# Patient Record
Sex: MALE | Race: WHITE | NOT HISPANIC OR LATINO | URBAN - METROPOLITAN AREA
[De-identification: names, ages, dates, MRNs, and addresses within clinical notes are randomized per-mention and may not be internally consistent; named-entity substitution may affect disease eponyms.]

---

## 2017-09-19 ENCOUNTER — APPOINTMENT (OUTPATIENT)
Dept: URBAN - METROPOLITAN AREA CLINIC 200 | Age: 62
Setting detail: DERMATOLOGY
End: 2017-09-24

## 2017-09-19 DIAGNOSIS — L57.8 OTHER SKIN CHANGES DUE TO CHRONIC EXPOSURE TO NONIONIZING RADIATION: ICD-10-CM

## 2017-09-19 DIAGNOSIS — L40.8 OTHER PSORIASIS: ICD-10-CM

## 2017-09-19 PROCEDURE — 99213 OFFICE O/P EST LOW 20 MIN: CPT

## 2017-09-19 PROCEDURE — OTHER PRESCRIPTION SAMPLES PROVIDED: OTHER

## 2017-09-19 PROCEDURE — OTHER COUNSELING: OTHER

## 2017-09-19 ASSESSMENT — LOCATION ZONE DERM
LOCATION ZONE: TRUNK
LOCATION ZONE: TOENAIL

## 2017-09-19 ASSESSMENT — LOCATION DETAILED DESCRIPTION DERM
LOCATION DETAILED: RIGHT GREAT TOENAIL
LOCATION DETAILED: RIGHT INFERIOR MEDIAL UPPER BACK

## 2017-09-19 ASSESSMENT — LOCATION SIMPLE DESCRIPTION DERM
LOCATION SIMPLE: RIGHT UPPER BACK
LOCATION SIMPLE: RIGHT GREAT TOE

## 2017-09-19 ASSESSMENT — PGA PSORIASIS: PGA PSORIASIS: MODERATE (MODERATE PLAQUE ELEVATION, MODERATE ERYTHEMA, COARSE SCALE PREDOMINATES)

## 2018-09-18 ENCOUNTER — APPOINTMENT (OUTPATIENT)
Dept: URBAN - METROPOLITAN AREA CLINIC 200 | Age: 63
Setting detail: DERMATOLOGY
End: 2018-09-23

## 2018-09-18 DIAGNOSIS — L57.8 OTHER SKIN CHANGES DUE TO CHRONIC EXPOSURE TO NONIONIZING RADIATION: ICD-10-CM

## 2018-09-18 PROBLEM — D23.5 OTHER BENIGN NEOPLASM OF SKIN OF TRUNK: Status: ACTIVE | Noted: 2018-09-18

## 2018-09-18 PROCEDURE — OTHER COUNSELING: OTHER

## 2018-09-18 PROCEDURE — 99213 OFFICE O/P EST LOW 20 MIN: CPT

## 2018-09-18 ASSESSMENT — LOCATION DETAILED DESCRIPTION DERM: LOCATION DETAILED: LEFT MEDIAL SUPERIOR CHEST

## 2018-09-18 ASSESSMENT — LOCATION ZONE DERM: LOCATION ZONE: TRUNK

## 2018-09-18 ASSESSMENT — LOCATION SIMPLE DESCRIPTION DERM: LOCATION SIMPLE: CHEST

## 2019-09-26 ENCOUNTER — APPOINTMENT (OUTPATIENT)
Dept: URBAN - METROPOLITAN AREA CLINIC 200 | Age: 64
Setting detail: DERMATOLOGY
End: 2019-09-30

## 2019-09-26 DIAGNOSIS — L82.1 OTHER SEBORRHEIC KERATOSIS: ICD-10-CM

## 2019-09-26 DIAGNOSIS — L57.8 OTHER SKIN CHANGES DUE TO CHRONIC EXPOSURE TO NONIONIZING RADIATION: ICD-10-CM

## 2019-09-26 PROBLEM — D23.5 OTHER BENIGN NEOPLASM OF SKIN OF TRUNK: Status: ACTIVE | Noted: 2019-09-26

## 2019-09-26 PROCEDURE — OTHER MIPS QUALITY: OTHER

## 2019-09-26 PROCEDURE — 99213 OFFICE O/P EST LOW 20 MIN: CPT

## 2019-09-26 PROCEDURE — OTHER REASSURANCE: OTHER

## 2019-09-26 PROCEDURE — OTHER COUNSELING: OTHER

## 2019-09-26 ASSESSMENT — LOCATION SIMPLE DESCRIPTION DERM
LOCATION SIMPLE: CHEST
LOCATION SIMPLE: RIGHT THIGH

## 2019-09-26 ASSESSMENT — LOCATION ZONE DERM
LOCATION ZONE: LEG
LOCATION ZONE: TRUNK

## 2019-09-26 ASSESSMENT — LOCATION DETAILED DESCRIPTION DERM
LOCATION DETAILED: LEFT MEDIAL SUPERIOR CHEST
LOCATION DETAILED: RIGHT ANTERIOR PROXIMAL THIGH

## 2020-04-23 ENCOUNTER — APPOINTMENT (OUTPATIENT)
Dept: URBAN - METROPOLITAN AREA CLINIC 200 | Age: 65
Setting detail: DERMATOLOGY
End: 2020-04-23

## 2020-04-23 DIAGNOSIS — L20.84 INTRINSIC (ALLERGIC) ECZEMA: ICD-10-CM

## 2020-04-23 PROCEDURE — OTHER PRESCRIPTION MEDICATION MANAGEMENT: OTHER

## 2020-04-23 PROCEDURE — OTHER REASON FOR TELEMEDICINE VISIT: OTHER

## 2020-04-23 PROCEDURE — OTHER TELEHEALTH ASSESSMENT: OTHER

## 2020-04-23 PROCEDURE — OTHER REASSURANCE: OTHER

## 2020-04-23 PROCEDURE — 99212 OFFICE O/P EST SF 10 MIN: CPT | Mod: 95

## 2020-04-23 PROCEDURE — OTHER COUNSELING: OTHER

## 2020-04-23 PROCEDURE — OTHER CONSENT FOR TELEMEDICINE VISIT OBTAINED: OTHER

## 2020-04-23 ASSESSMENT — LOCATION SIMPLE DESCRIPTION DERM: LOCATION SIMPLE: RIGHT PRETIBIAL REGION

## 2020-04-23 ASSESSMENT — LOCATION ZONE DERM: LOCATION ZONE: LEG

## 2020-04-23 ASSESSMENT — LOCATION DETAILED DESCRIPTION DERM: LOCATION DETAILED: RIGHT DISTAL PRETIBIAL REGION

## 2020-04-23 ASSESSMENT — BSA RASH: BSA RASH: 1

## 2020-04-23 ASSESSMENT — SEVERITY ASSESSMENT 2020: SEVERITY 2020: MILD

## 2020-04-23 NOTE — PROCEDURE: PRESCRIPTION MEDICATION MANAGEMENT
Detail Level: Detailed
Otc Regimen: Thick moisturizer.  No Dial soap.  Continue to use Dove.\\n
Plan: Will call in steroid if needed
Render In Strict Bullet Format?: No

## 2020-09-29 ENCOUNTER — APPOINTMENT (OUTPATIENT)
Dept: URBAN - METROPOLITAN AREA CLINIC 200 | Age: 65
Setting detail: DERMATOLOGY
End: 2020-10-04

## 2020-09-29 DIAGNOSIS — L57.8 OTHER SKIN CHANGES DUE TO CHRONIC EXPOSURE TO NONIONIZING RADIATION: ICD-10-CM

## 2020-09-29 PROBLEM — I48.91 UNSPECIFIED ATRIAL FIBRILLATION: Status: ACTIVE | Noted: 2020-09-29

## 2020-09-29 PROCEDURE — 99213 OFFICE O/P EST LOW 20 MIN: CPT

## 2020-09-29 PROCEDURE — OTHER COUNSELING: OTHER

## 2020-09-29 ASSESSMENT — LOCATION DETAILED DESCRIPTION DERM: LOCATION DETAILED: LEFT MEDIAL SUPERIOR CHEST

## 2020-09-29 ASSESSMENT — LOCATION SIMPLE DESCRIPTION DERM: LOCATION SIMPLE: CHEST

## 2020-09-29 ASSESSMENT — LOCATION ZONE DERM: LOCATION ZONE: TRUNK

## 2022-10-12 ENCOUNTER — APPOINTMENT (OUTPATIENT)
Dept: URBAN - METROPOLITAN AREA CLINIC 203 | Age: 67
Setting detail: DERMATOLOGY
End: 2022-10-12

## 2022-10-12 DIAGNOSIS — L82.1 OTHER SEBORRHEIC KERATOSIS: ICD-10-CM

## 2022-10-12 DIAGNOSIS — L57.0 ACTINIC KERATOSIS: ICD-10-CM

## 2022-10-12 DIAGNOSIS — Z11.52 ENCOUNTER FOR SCREENING FOR COVID-19: ICD-10-CM

## 2022-10-12 DIAGNOSIS — L57.8 OTHER SKIN CHANGES DUE TO CHRONIC EXPOSURE TO NONIONIZING RADIATION: ICD-10-CM

## 2022-10-12 PROCEDURE — OTHER MIPS QUALITY: OTHER

## 2022-10-12 PROCEDURE — OTHER SCREENING FOR COVID-19: OTHER

## 2022-10-12 PROCEDURE — OTHER SUNSCREEN RECOMMENDATIONS: OTHER

## 2022-10-12 PROCEDURE — OTHER LIQUID NITROGEN: OTHER

## 2022-10-12 PROCEDURE — 17003 DESTRUCT PREMALG LES 2-14: CPT

## 2022-10-12 PROCEDURE — 99213 OFFICE O/P EST LOW 20 MIN: CPT | Mod: 25

## 2022-10-12 PROCEDURE — OTHER COUNSELING: OTHER

## 2022-10-12 PROCEDURE — OTHER REASSURANCE: OTHER

## 2022-10-12 PROCEDURE — 17000 DESTRUCT PREMALG LESION: CPT

## 2022-10-12 ASSESSMENT — LOCATION DETAILED DESCRIPTION DERM
LOCATION DETAILED: RIGHT CENTRAL PARIETAL SCALP
LOCATION DETAILED: RIGHT SUPERIOR PARIETAL SCALP
LOCATION DETAILED: LEFT SUPERIOR PARIETAL SCALP
LOCATION DETAILED: RIGHT VENTRAL PROXIMAL FOREARM
LOCATION DETAILED: LEFT MEDIAL INFERIOR CHEST
LOCATION DETAILED: EPIGASTRIC SKIN
LOCATION DETAILED: RIGHT VENTRAL PROXIMAL FOREARM
LOCATION DETAILED: RIGHT CENTRAL FRONTAL SCALP
LOCATION DETAILED: MID-OCCIPITAL SCALP

## 2022-10-12 ASSESSMENT — LOCATION ZONE DERM
LOCATION ZONE: TRUNK
LOCATION ZONE: ARM
LOCATION ZONE: SCALP
LOCATION ZONE: ARM

## 2022-10-12 ASSESSMENT — LOCATION SIMPLE DESCRIPTION DERM
LOCATION SIMPLE: ABDOMEN
LOCATION SIMPLE: POSTERIOR SCALP
LOCATION SIMPLE: CHEST
LOCATION SIMPLE: RIGHT FOREARM
LOCATION SIMPLE: RIGHT FOREARM
LOCATION SIMPLE: SCALP
LOCATION SIMPLE: RIGHT SCALP

## 2022-10-12 ASSESSMENT — PAIN INTENSITY VAS: HOW INTENSE IS YOUR PAIN 0 BEING NO PAIN, 10 BEING THE MOST SEVERE PAIN POSSIBLE?: NO PAIN

## 2022-10-12 NOTE — PROCEDURE: LIQUID NITROGEN
Show Aperture Variable?: Yes
Consent: The patient's consent was obtained including but not limited to risks of crusting, scabbing, blistering, scarring, darker or lighter pigmentary change, recurrence, incomplete removal and infection.
Render Post-Care Instructions In Note?: no
Post-Care Instructions: I reviewed with the patient in detail post-care instructions. Patient is to wear sunprotection, and avoid picking at any of the treated lesions. Pt may apply Vaseline to crusted or scabbing areas.
Duration Of Freeze Thaw-Cycle (Seconds): 0
Application Tool (Optional): Liquid Nitrogen Sprayer
Detail Level: Detailed

## 2023-12-20 ENCOUNTER — APPOINTMENT (OUTPATIENT)
Dept: URBAN - METROPOLITAN AREA CLINIC 203 | Age: 68
Setting detail: DERMATOLOGY
End: 2023-12-21

## 2023-12-20 DIAGNOSIS — L57.8 OTHER SKIN CHANGES DUE TO CHRONIC EXPOSURE TO NONIONIZING RADIATION: ICD-10-CM

## 2023-12-20 DIAGNOSIS — L57.0 ACTINIC KERATOSIS: ICD-10-CM

## 2023-12-20 PROCEDURE — OTHER LIQUID NITROGEN: OTHER

## 2023-12-20 PROCEDURE — OTHER SUNSCREEN RECOMMENDATIONS: OTHER

## 2023-12-20 PROCEDURE — 99213 OFFICE O/P EST LOW 20 MIN: CPT | Mod: 25

## 2023-12-20 PROCEDURE — 17003 DESTRUCT PREMALG LES 2-14: CPT

## 2023-12-20 PROCEDURE — OTHER COUNSELING: OTHER

## 2023-12-20 PROCEDURE — 17000 DESTRUCT PREMALG LESION: CPT

## 2023-12-20 ASSESSMENT — LOCATION DETAILED DESCRIPTION DERM
LOCATION DETAILED: RIGHT MID-UPPER BACK
LOCATION DETAILED: LEFT MEDIAL FRONTAL SCALP
LOCATION DETAILED: LEFT SUPERIOR PARIETAL SCALP
LOCATION DETAILED: RIGHT SUPERIOR FOREHEAD
LOCATION DETAILED: LEFT MEDIAL INFERIOR CHEST
LOCATION DETAILED: LEFT LATERAL FRONTAL SCALP
LOCATION DETAILED: RIGHT CENTRAL FRONTAL SCALP
LOCATION DETAILED: RIGHT SUPERIOR PARIETAL SCALP
LOCATION DETAILED: RIGHT MEDIAL FRONTAL SCALP

## 2023-12-20 ASSESSMENT — LOCATION ZONE DERM
LOCATION ZONE: TRUNK
LOCATION ZONE: SCALP
LOCATION ZONE: FACE

## 2023-12-20 ASSESSMENT — LOCATION SIMPLE DESCRIPTION DERM
LOCATION SIMPLE: RIGHT FOREHEAD
LOCATION SIMPLE: RIGHT UPPER BACK
LOCATION SIMPLE: LEFT SCALP
LOCATION SIMPLE: CHEST
LOCATION SIMPLE: RIGHT SCALP
LOCATION SIMPLE: SCALP

## 2023-12-20 ASSESSMENT — PAIN INTENSITY VAS: HOW INTENSE IS YOUR PAIN 0 BEING NO PAIN, 10 BEING THE MOST SEVERE PAIN POSSIBLE?: NO PAIN

## 2023-12-20 NOTE — PROCEDURE: LIQUID NITROGEN
Post-Care Instructions: I reviewed with the patient in detail post-care instructions. Patient is to wear sunprotection, and avoid picking at any of the treated lesions. Pt may apply Vaseline to crusted or scabbing areas.
Render Post-Care Instructions In Note?: no
Consent: The patient's consent was obtained including but not limited to risks of crusting, scabbing, blistering, scarring, darker or lighter pigmentary change, recurrence, incomplete removal and infection.
Duration Of Freeze Thaw-Cycle (Seconds): 5
Detail Level: Zone
Show Applicator Variable?: Yes
Number Of Freeze-Thaw Cycles: 2 freeze-thaw cycles
Application Tool (Optional): Liquid Nitrogen Sprayer

## 2024-12-23 ENCOUNTER — PRE-ADMISSION TESTING (OUTPATIENT)
Dept: PREADMISSION TESTING | Age: 69
End: 2024-12-23
Payer: COMMERCIAL

## 2024-12-23 VITALS — HEIGHT: 71 IN | WEIGHT: 230 LBS | BODY MASS INDEX: 32.2 KG/M2

## 2024-12-23 RX ORDER — NAPROXEN SODIUM 220 MG/1
81 TABLET, FILM COATED ORAL DAILY
COMMUNITY

## 2024-12-23 RX ORDER — TAMSULOSIN HYDROCHLORIDE 0.4 MG/1
0.4 CAPSULE ORAL NIGHTLY
COMMUNITY
Start: 2024-12-10

## 2024-12-23 RX ORDER — LOVASTATIN 40 MG/1
TABLET ORAL
COMMUNITY
Start: 2024-11-29

## 2024-12-23 RX ORDER — LEVOTHYROXINE SODIUM 125 UG/1
125 TABLET ORAL
COMMUNITY
Start: 2024-11-27

## 2024-12-23 RX ORDER — METOPROLOL SUCCINATE 50 MG/1
TABLET, EXTENDED RELEASE ORAL
Status: ON HOLD | COMMUNITY
Start: 2024-11-29 | End: 2025-01-16

## 2024-12-23 RX ORDER — PANTOPRAZOLE SODIUM 40 MG/1
TABLET, DELAYED RELEASE ORAL
COMMUNITY
Start: 2024-12-04

## 2024-12-23 RX ORDER — ZOLPIDEM TARTRATE 6.25 MG/1
6.25 TABLET, FILM COATED, EXTENDED RELEASE ORAL NIGHTLY PRN
Status: ON HOLD | COMMUNITY
End: 2025-01-16

## 2024-12-23 RX ORDER — HYDROCHLOROTHIAZIDE 12.5 MG/1
12.5 TABLET ORAL DAILY
Status: ON HOLD | COMMUNITY
Start: 2024-12-10 | End: 2025-01-16

## 2024-12-23 RX ORDER — IBUPROFEN 800 MG/1
800 TABLET ORAL 3 TIMES DAILY PRN
COMMUNITY
Start: 2024-11-12 | End: 2025-01-16 | Stop reason: HOSPADM

## 2024-12-23 RX ORDER — LOSARTAN POTASSIUM 50 MG/1
50 TABLET ORAL 2 TIMES DAILY
Status: ON HOLD | COMMUNITY
Start: 2024-12-13 | End: 2025-01-16

## 2024-12-23 NOTE — PRE-PROCEDURE INSTRUCTIONS
[facility]  [address] [place] [address]    1.       Admissions will call you with your arrival time on 1/14 (day prior to surgery) between 2pm-4pm.  For questions about your arrival time, please call [phone].    2.       On the day of your procedure please report to the [facility] in the [place]. Please arrive through the [facility].  If you are parking in the [facility], come to the ground floor of the garage and follow signs to the [facility].  After being screened, please report to either the Outpatient Registration for Pre-Admission Testing or to the Surgery Registration Desk.    3. Please follow the following fasting guidelines:     No solid food EIGHT HOURS prior to arrival time on day of surgery.  6 ounces of clear liquids, meaning water or PLAIN black coffee WITHOUT any milk, cream, sugar, or sweetener are permitted up to TWO HOURS prior to arrival at the hospital.    4. Please take ONLY the following medications with a sip of water on the morning of your procedure: (populate names and/or NONE)  Levothyroxine, Pantoprazole    5. Other Instructions: You may brush your teeth the morning of the procedure. Rinse and spit, do not swallow.  Bring a list of your medications with dosages.  Use surgical wash as directed.     6. If you develop a cold, cough, fever, rash, or other symptom prior to the day of the procedure, please report it to your physician immediately.    7. If you need to cancel the procedure for any reason, please contact your physician.    8. Make arrangements to have safe transportation home accompanied by a responsible adult. If you have not arranged safe transportation home, your surgery will be cancelled. Safe transportation may include private vehicle, ride-share service, taxi and public transportation when accompanied by a responsible adult who will assist you home. A responsible adult is someone known to you and does not  include the taxi, ride-share or public transit drive transporting you.    9.  If it is medically necessary for you to have a longer stay, you will be informed as soon as the decision is made.    10. Only bring essential items to the hospital.  Do not wear or bring anything of value to the hospital including jewelry of any kind, money, or wallet. Do not wear make-up or contact lenses.  DO NOT BRING MEDICATIONS FROM HOME unless instructed to do so. DO bring your hearing aids, glasses, and a case    11. No lotion, creams, powders, or oils on skin the morning of procedure     12. Dress in comfortable clothes.    13.  If instructed, please bring a copy of your Advanced Directive (Living Will/Durable Power of ) on the day of your procedure.     14. Ensuring your safety at all times is a very important part of our [facility] Culture of Safety. After having surgery and sedation, you are at risk for falling and balance issues. Although you may feel awake, the effects of the medication can last up to 24 hours after anesthesia. If you need to use the bathroom during your recovery period, nursing staff will escort you there and stay with you to ensure your safety.    15. Refrain from drinking alcohol and smoking cigarettes for 24 hours prior to surgery.    16. Shower with antibacterial soap (Dial) the night before and morning of your procedure.  If your procedure indicates the need for CHG antiseptic wash (Bactoshield or Hibiclens), please use this instead and follow instructions as discussed at the time of your Pre-Admission Testing phone interview or visit.    Above instructions reviewed with patient and patient acknowledges understanding.    Form explained by: Funmilayo Jameson RN

## 2024-12-26 ENCOUNTER — TRANSCRIBE ORDERS (OUTPATIENT)
Dept: PREADMISSION TESTING | Facility: HOSPITAL | Age: 69
End: 2024-12-26

## 2024-12-26 DIAGNOSIS — M19.012 PRIMARY OSTEOARTHRITIS, LEFT SHOULDER: ICD-10-CM

## 2024-12-26 DIAGNOSIS — M75.122 COMPLETE ROTATOR CUFF TEAR OR RUPTURE OF LEFT SHOULDER, NOT SPECIFIED AS TRAUMATIC: Primary | ICD-10-CM

## 2024-12-27 ENCOUNTER — HOSPITAL ENCOUNTER (OUTPATIENT)
Dept: CARDIOLOGY | Facility: HOSPITAL | Age: 69
Discharge: HOME | End: 2024-12-27
Attending: ORTHOPAEDIC SURGERY
Payer: COMMERCIAL

## 2024-12-27 ENCOUNTER — PRE-ADMISSION TESTING (OUTPATIENT)
Dept: PREADMISSION TESTING | Facility: HOSPITAL | Age: 69
End: 2024-12-27
Attending: ORTHOPAEDIC SURGERY
Payer: COMMERCIAL

## 2024-12-27 VITALS
TEMPERATURE: 97.8 F | WEIGHT: 236.9 LBS | HEIGHT: 71 IN | SYSTOLIC BLOOD PRESSURE: 134 MMHG | OXYGEN SATURATION: 97 % | BODY MASS INDEX: 33.17 KG/M2 | HEART RATE: 83 BPM | RESPIRATION RATE: 18 BRPM | DIASTOLIC BLOOD PRESSURE: 79 MMHG

## 2024-12-27 DIAGNOSIS — N40.0 BENIGN PROSTATIC HYPERPLASIA, UNSPECIFIED WHETHER LOWER URINARY TRACT SYMPTOMS PRESENT: ICD-10-CM

## 2024-12-27 DIAGNOSIS — I48.0 PAF (PAROXYSMAL ATRIAL FIBRILLATION) (CMS/HCC): ICD-10-CM

## 2024-12-27 DIAGNOSIS — E03.9 HYPOTHYROIDISM, UNSPECIFIED TYPE: ICD-10-CM

## 2024-12-27 DIAGNOSIS — Z01.818 ENCOUNTER FOR PRE-OPERATIVE EXAMINATION: Primary | ICD-10-CM

## 2024-12-27 DIAGNOSIS — E78.49 OTHER HYPERLIPIDEMIA: ICD-10-CM

## 2024-12-27 DIAGNOSIS — M75.122 COMPLETE ROTATOR CUFF TEAR OR RUPTURE OF LEFT SHOULDER, NOT SPECIFIED AS TRAUMATIC: ICD-10-CM

## 2024-12-27 DIAGNOSIS — M19.012 PRIMARY OSTEOARTHRITIS, LEFT SHOULDER: ICD-10-CM

## 2024-12-27 DIAGNOSIS — I10 PRIMARY HYPERTENSION: ICD-10-CM

## 2024-12-27 DIAGNOSIS — E87.1 HYPONATREMIA: ICD-10-CM

## 2024-12-27 DIAGNOSIS — M19.012 PRIMARY OSTEOARTHRITIS OF LEFT SHOULDER: ICD-10-CM

## 2024-12-27 DIAGNOSIS — I71.21 ANEURYSM OF ASCENDING AORTA WITHOUT RUPTURE (CMS/HCC): ICD-10-CM

## 2024-12-27 DIAGNOSIS — I25.10 CORONARY ARTERY DISEASE INVOLVING NATIVE CORONARY ARTERY OF NATIVE HEART WITHOUT ANGINA PECTORIS: ICD-10-CM

## 2024-12-27 LAB
ABO + RH BLD: NORMAL
ANION GAP SERPL CALC-SCNC: 9 MEQ/L (ref 3–15)
ATRIAL RATE: 83
BLD GP AB SCN SERPL QL: NEGATIVE
BLOOD BANK CMNT PATIENT-IMP: NORMAL
BUN SERPL-MCNC: 16 MG/DL (ref 7–25)
CALCIUM SERPL-MCNC: 9.9 MG/DL (ref 8.6–10.3)
CHLORIDE SERPL-SCNC: 98 MEQ/L (ref 98–107)
CO2 SERPL-SCNC: 24 MEQ/L (ref 21–31)
CREAT SERPL-MCNC: 0.9 MG/DL (ref 0.7–1.3)
D AG BLD QL: POSITIVE
EGFRCR SERPLBLD CKD-EPI 2021: >60 ML/MIN/1.73M*2
ERYTHROCYTE [DISTWIDTH] IN BLOOD BY AUTOMATED COUNT: 11.9 % (ref 11.6–14.4)
GLUCOSE SERPL-MCNC: 106 MG/DL (ref 70–99)
HCT VFR BLD AUTO: 40.1 % (ref 40.1–51)
HGB BLD-MCNC: 14.2 G/DL (ref 13.7–17.5)
LABORATORY COMMENT REPORT: NORMAL
MCH RBC QN AUTO: 34.3 PG (ref 28–33.2)
MCHC RBC AUTO-ENTMCNC: 35.4 G/DL (ref 32.2–36.5)
MCV RBC AUTO: 96.9 FL (ref 83–98)
P AXIS: 41
PLATELET # BLD AUTO: 177 K/UL (ref 150–350)
PMV BLD AUTO: 10.9 FL (ref 9.4–12.4)
POTASSIUM SERPL-SCNC: 4 MEQ/L (ref 3.5–5.1)
PR INTERVAL: 186
QRS DURATION: 92
QT INTERVAL: 372
QTC CALCULATION(BAZETT): 437
R AXIS: 62
RBC # BLD AUTO: 4.14 M/UL (ref 4.5–5.8)
SODIUM SERPL-SCNC: 131 MEQ/L (ref 136–145)
SPECIMEN EXP DATE BLD: NORMAL
T WAVE AXIS: 53
VENTRICULAR RATE: 83
WBC # BLD AUTO: 4.7 K/UL (ref 3.8–10.5)

## 2024-12-27 PROCEDURE — 99204 OFFICE O/P NEW MOD 45 MIN: CPT | Performed by: HOSPITALIST

## 2024-12-27 PROCEDURE — 93005 ELECTROCARDIOGRAM TRACING: CPT

## 2024-12-27 PROCEDURE — 80048 BASIC METABOLIC PNL TOTAL CA: CPT

## 2024-12-27 PROCEDURE — 85027 COMPLETE CBC AUTOMATED: CPT

## 2024-12-27 PROCEDURE — 86850 RBC ANTIBODY SCREEN: CPT

## 2024-12-27 PROCEDURE — 36415 COLL VENOUS BLD VENIPUNCTURE: CPT

## 2024-12-27 PROCEDURE — 86901 BLOOD TYPING SEROLOGIC RH(D): CPT

## 2024-12-27 NOTE — ASSESSMENT & PLAN NOTE
Status post cardioversion  Currently in sinus rhythm  Recommend monitoring on telemetry postop  Not on Eliquis-last time he took Eliquis was in September 2023

## 2024-12-27 NOTE — ASSESSMENT & PLAN NOTE
Na is 131- secondary to hctz  Advised to increase salt intake and needs repeat bmp on the am of surgery

## 2024-12-27 NOTE — ASSESSMENT & PLAN NOTE
Continue Flomax  He is at risk of urinary retention postop and this can be minimized by allowing early ambulation and avoiding constipation

## 2024-12-27 NOTE — ASSESSMENT & PLAN NOTE
Scheduled to have Left Reverse Total Shoulder Arthroplasty  by Dr Medellin on 1/15/25  Complaining of left shoulder pain for the last 10 years and worse for the last 5 years   He had shoulder release and debridement of biceps tendon on and 2019  he denies any exertional chest pain or sob and his exercise tolerance is > 4 mets. He was cleared by dr Dias from cardiology  he has a history of CAD but no stroke  he denies any bowel disturbance but has urinary frequency  he denies any history of dvt/pe  he denies any snoring   EKG shows normal sinus rhythm with no st changes

## 2024-12-27 NOTE — CONSULTS
Division of [facility] Medicine -  Pre-Operative Consultation       Patient Name: Yury Nguyen  Referring Surgeon: Dr Medellin    Reason for Referral: Pre-Operative Evaluation  Surgical Procedure: Left Reverse Shoulder Arthroplasty  Operative Date: 1/15/25  Other Providers:      PCP: Efraín Landeros DO          HISTORY OF PRESENT ILLNESS      Yury Nguyen is a 69 y.o. male presenting today to the [facility] at [place] for pre-operative evaluation prior to planned surgery.    Complaining of left shoulder pain for the last 10 years and worse for the last 5 years   He had shoulder release and debridement of biceps tendon on and 2019  he denies any exertional chest pain or sob and his exercise tolerance is > 4 mets. He was cleared by dr Dias from cardiology  he has a history of CAD but no stroke         In regards to medical history:  CAD  PAF  Hyperlipidemia  Hypothyroidsm  Hypertension  BPH    The patient denies any current or recent chest pain or pressure, dyspnea, cough, sputum, fevers, chills, abdominal pain, nausea, vomiting, diarrhea or other symptoms.     Functionally, the patient is able to ascend a flight or so of stairs with no dyspnea or chest pain.     The patient denies, on specific questioning, the following:  No history of MI or CHF.  + history of PAF  No history of VERONICA.  No history of DVT/PE.  No history of COPD.  No history of CVA.  No history of DM.   No history of CKD.     PAST MEDICAL AND SURGICAL HISTORY      Past Medical History:   Diagnosis Date    A-fib (CMS/HCC)     Arthritis     BPH (benign prostatic hyperplasia)     CAD (coronary artery disease)     Colon polyps     Gout     Hypothyroidism     Thoracic ascending aortic aneurysm (CMS/HCC)        Past Surgical History   Procedure Laterality Date    Arthrodesis Left     revision great toe    Cataract extraction w/  intraocular lens implant Bilateral 04/2024    Cervical fusion      C4,  C5, C6    Distal clavicle excision Left     Elbow surgery Left     lateral release    Elbow surgery Right     lateral and medial release    Osteotomy Left 09/2023    5th metatarsal, arthrodesis great toe    Shoulder surgery Bilateral 2021    shoulder release and debridment biceps tendon    Spermatocelectomy Bilateral     Tenotomy Left     elbow    Umbilical hernia repair         MEDICATIONS        Current Outpatient Medications:     apixaban (ELIQUIS) 2.5 mg tablet, Take 2.5 mg by mouth 2 (two) times a day. PRN a fib, Disp: , Rfl:     aspirin 81 mg chewable tablet, Take 81 mg by mouth daily. Stopping 1/1/2025, Disp: , Rfl:     cholecalciferol, vitamin D3, (D3-5000 ORAL), Take by mouth., Disp: , Rfl:     hydrochlorothiazide 12.5 mg tablet, Take 12.5 mg by mouth daily., Disp: , Rfl:     ibuprofen (MOTRIN) 800 mg tablet, Take 800 mg by mouth 3 (three) times a day as needed., Disp: , Rfl:     levothyroxine (SYNTHROID) 125 mcg tablet, Take 125 mcg by mouth daily. 6 days a week., Disp: , Rfl:     losartan (COZAAR) 50 mg tablet, Take 50 mg by mouth 2 (two) times a day., Disp: , Rfl:     lovastatin (MEVACOR) 40 mg tablet, take 1 tablet by mouth daily with dinner, Disp: , Rfl:     MAGNESIUM GLYCINATE, BULK, MISC, , Disp: , Rfl:     mecobalamin (B12 ACTIVE ORAL), Take by mouth., Disp: , Rfl:     metoprolol succinate XL (TOPROL-XL) 50 mg 24 hr tablet, TAKE 1 AND 1/2 TABLETS BY MOUTH EVERY 12 HOURS, Disp: , Rfl:     multivitamin tablet, Take by mouth daily. Stopping 12/25, Disp: , Rfl:     pantoprazole (PROTONIX) 40 mg EC tablet, TAKE 1 TABLET BY MOUTH EVERY DAY TAKE 30-60 MINUTES BEFORE A MEAL, Disp: , Rfl:     tamsulosin (FLOMAX) 0.4 mg capsule, Take 0.4 mg by mouth nightly., Disp: , Rfl:     vitamin E,dl-alpha tocopherol, (VITAMIN E, BULK, MISC), Stopping 12/25, Disp: , Rfl:     zolpidem CR (AMBIEN CR) 6.25 mg CR tablet, Take 6.25 mg by mouth nightly as needed for sleep., Disp: , Rfl:     ALLERGIES      Penicillin    FAMILY  "HISTORY      family history includes Cancer in his biological mother; Colon cancer in his biological father; Lymphoma in his biological brother; Polymyalgia rheumatica in his biological brother.    Denies any prior known family history of DVTs/PEs/clotting disorder    SOCIAL HISTORY      Social History     Tobacco Use    Smoking status: Never    Smokeless tobacco: Never   Vaping Use    Vaping status: Never Used   Substance Use Topics    Alcohol use: Yes     Comment: 3/day    Drug use: Never       REVIEW OF SYSTEMS      All other systems reviewed and negative except as noted in HPI    PHYSICAL EXAMINATION      Visit Vitals  /79 (BP Location: Left upper arm, Patient Position: Sitting)   Pulse 83   Temp 36.6 °C (97.8 °F) (Temporal)   Resp 18   Ht 1.803 m (5' 11\")   Wt 107 kg (236 lb 14.4 oz)   SpO2 97%   BMI 33.04 kg/m²     Body mass index is 33.04 kg/m².    Physical Exam  General appearance: alert, appears stated age, cooperative, non-toxic  Head: normocephalic, without obvious abnormality, atraumatic  Eyes: conjunctivae clear. PERRL, EOMI's intact.  Lungs: clear to auscultation bilaterally   Heart: regular rate and rhythm, S1, S2 normal,   Abdomen: Nondistended, +BS, soft, non-tender, no masses palpable   Extremities: Left shoulder range of movements limited secondary to pain  Pulses: 2+ and symmetric B/L DP  Neurologic: Alert and oriented X 3, no focal deficits  Skin: intact, no rashes or lesions     LABS / EKG        Labs  Labs are pending.    Lab Results   Component Value Date     (L) 12/27/2024    K 4.0 12/27/2024    CL 98 12/27/2024    BUN 16 12/27/2024    CREATININE 0.9 12/27/2024    WBC 4.70 12/27/2024    HGB 14.2 12/27/2024    HCT 40.1 12/27/2024     12/27/2024         ECG/Telemetry  I have independently reviewed the ECG. No significant findings.    ASSESSMENT AND PLAN         Encounter for pre-operative examination  Scheduled to have Left Reverse Total Shoulder Arthroplasty  by Dr Medellin " on 1/15/25  Complaining of left shoulder pain for the last 10 years and worse for the last 5 years   He had shoulder release and debridement of biceps tendon on and 2019  he denies any exertional chest pain or sob and his exercise tolerance is > 4 mets. He was cleared by dr Dias from cardiology  he has a history of CAD but no stroke  he denies any bowel disturbance but has urinary frequency  he denies any history of dvt/pe  he denies any snoring   EKG shows normal sinus rhythm with no st changes    Coronary artery disease involving native coronary artery of native heart without angina pectoris  Continue aspirin, statin, Toprol but hold losartan on the morning of surgery    Primary hypertension  Continue Toprol but hold losartan and HCTZ on the morning of surgery    Other hyperlipidemia  Continue lovastatin    BPH (benign prostatic hyperplasia)  Continue Flomax  He is at risk of urinary retention postop and this can be minimized by allowing early ambulation and avoiding constipation    Hypothyroidism  Continue levothyroxine    PAF (paroxysmal atrial fibrillation) (CMS/HCC)  Status post cardioversion  Currently in sinus rhythm  Recommend monitoring on telemetry postop  Not on Eliquis-last time he took Eliquis was in September 2023    Aneurysm of ascending aorta without rupture (CMS/HCC)  Under surveillance  Strict bp control    Primary osteoarthritis of left shoulder  Scheduled for reverse shoulder arthroplasty    Hyponatremia  Na is 131- secondary to hctz  Advised to increase salt intake and needs repeat bmp on the am of surgery     In regards to perioperative cardiac risk:  Regarding perioperative cardiovascular risk:  The patient has the following risk factors: history of ischemic heart disease.  This correlates to a rCRI score of 1 with perioperative cardiac event risk of 0.9%.  Shoulder arthroplasty is an intermediate risk procedure and he is at acceptable risk for surgery.  Patient needs repeat BMP on the  morning of surgery secondary to hyponatremia    Further comments:  Resume supplements when OK with surgical team.  I would encourage incentive spirometry to assist with minimizing sabi-operative pulmonary risk.  DVT prophylaxis and timing of such per the discretion of the surgeon.     Please do not hesitate to contact [facility] during the upcoming hospitalization with any questions or concerns.     Romie Dias MD  12/27/2024

## 2024-12-27 NOTE — H&P (VIEW-ONLY)
Division of [facility] Medicine -  Pre-Operative Consultation       Patient Name: Yury Nguyen  Referring Surgeon: Dr Medellin    Reason for Referral: Pre-Operative Evaluation  Surgical Procedure: Left Reverse Shoulder Arthroplasty  Operative Date: 1/15/25  Other Providers:      PCP: Efraín Landeros DO          HISTORY OF PRESENT ILLNESS      Yury Nguyen is a 69 y.o. male presenting today to the [facility] at [place] for pre-operative evaluation prior to planned surgery.    Complaining of left shoulder pain for the last 10 years and worse for the last 5 years   He had shoulder release and debridement of biceps tendon on and 2019  he denies any exertional chest pain or sob and his exercise tolerance is > 4 mets. He was cleared by dr Dias from cardiology  he has a history of CAD but no stroke         In regards to medical history:  CAD  PAF  Hyperlipidemia  Hypothyroidsm  Hypertension  BPH    The patient denies any current or recent chest pain or pressure, dyspnea, cough, sputum, fevers, chills, abdominal pain, nausea, vomiting, diarrhea or other symptoms.     Functionally, the patient is able to ascend a flight or so of stairs with no dyspnea or chest pain.     The patient denies, on specific questioning, the following:  No history of MI or CHF.  + history of PAF  No history of VERONICA.  No history of DVT/PE.  No history of COPD.  No history of CVA.  No history of DM.   No history of CKD.     PAST MEDICAL AND SURGICAL HISTORY      Past Medical History:   Diagnosis Date    A-fib (CMS/HCC)     Arthritis     BPH (benign prostatic hyperplasia)     CAD (coronary artery disease)     Colon polyps     Gout     Hypothyroidism     Thoracic ascending aortic aneurysm (CMS/HCC)        Past Surgical History   Procedure Laterality Date    Arthrodesis Left     revision great toe    Cataract extraction w/  intraocular lens implant Bilateral 04/2024    Cervical fusion      C4,  C5, C6    Distal clavicle excision Left     Elbow surgery Left     lateral release    Elbow surgery Right     lateral and medial release    Osteotomy Left 09/2023    5th metatarsal, arthrodesis great toe    Shoulder surgery Bilateral 2021    shoulder release and debridment biceps tendon    Spermatocelectomy Bilateral     Tenotomy Left     elbow    Umbilical hernia repair         MEDICATIONS        Current Outpatient Medications:     apixaban (ELIQUIS) 2.5 mg tablet, Take 2.5 mg by mouth 2 (two) times a day. PRN a fib, Disp: , Rfl:     aspirin 81 mg chewable tablet, Take 81 mg by mouth daily. Stopping 1/1/2025, Disp: , Rfl:     cholecalciferol, vitamin D3, (D3-5000 ORAL), Take by mouth., Disp: , Rfl:     hydrochlorothiazide 12.5 mg tablet, Take 12.5 mg by mouth daily., Disp: , Rfl:     ibuprofen (MOTRIN) 800 mg tablet, Take 800 mg by mouth 3 (three) times a day as needed., Disp: , Rfl:     levothyroxine (SYNTHROID) 125 mcg tablet, Take 125 mcg by mouth daily. 6 days a week., Disp: , Rfl:     losartan (COZAAR) 50 mg tablet, Take 50 mg by mouth 2 (two) times a day., Disp: , Rfl:     lovastatin (MEVACOR) 40 mg tablet, take 1 tablet by mouth daily with dinner, Disp: , Rfl:     MAGNESIUM GLYCINATE, BULK, MISC, , Disp: , Rfl:     mecobalamin (B12 ACTIVE ORAL), Take by mouth., Disp: , Rfl:     metoprolol succinate XL (TOPROL-XL) 50 mg 24 hr tablet, TAKE 1 AND 1/2 TABLETS BY MOUTH EVERY 12 HOURS, Disp: , Rfl:     multivitamin tablet, Take by mouth daily. Stopping 12/25, Disp: , Rfl:     pantoprazole (PROTONIX) 40 mg EC tablet, TAKE 1 TABLET BY MOUTH EVERY DAY TAKE 30-60 MINUTES BEFORE A MEAL, Disp: , Rfl:     tamsulosin (FLOMAX) 0.4 mg capsule, Take 0.4 mg by mouth nightly., Disp: , Rfl:     vitamin E,dl-alpha tocopherol, (VITAMIN E, BULK, MISC), Stopping 12/25, Disp: , Rfl:     zolpidem CR (AMBIEN CR) 6.25 mg CR tablet, Take 6.25 mg by mouth nightly as needed for sleep., Disp: , Rfl:     ALLERGIES      Penicillin    FAMILY  "HISTORY      family history includes Cancer in his biological mother; Colon cancer in his biological father; Lymphoma in his biological brother; Polymyalgia rheumatica in his biological brother.    Denies any prior known family history of DVTs/PEs/clotting disorder    SOCIAL HISTORY      Social History     Tobacco Use    Smoking status: Never    Smokeless tobacco: Never   Vaping Use    Vaping status: Never Used   Substance Use Topics    Alcohol use: Yes     Comment: 3/day    Drug use: Never       REVIEW OF SYSTEMS      All other systems reviewed and negative except as noted in HPI    PHYSICAL EXAMINATION      Visit Vitals  /79 (BP Location: Left upper arm, Patient Position: Sitting)   Pulse 83   Temp 36.6 °C (97.8 °F) (Temporal)   Resp 18   Ht 1.803 m (5' 11\")   Wt 107 kg (236 lb 14.4 oz)   SpO2 97%   BMI 33.04 kg/m²     Body mass index is 33.04 kg/m².    Physical Exam  General appearance: alert, appears stated age, cooperative, non-toxic  Head: normocephalic, without obvious abnormality, atraumatic  Eyes: conjunctivae clear. PERRL, EOMI's intact.  Lungs: clear to auscultation bilaterally   Heart: regular rate and rhythm, S1, S2 normal,   Abdomen: Nondistended, +BS, soft, non-tender, no masses palpable   Extremities: Left shoulder range of movements limited secondary to pain  Pulses: 2+ and symmetric B/L DP  Neurologic: Alert and oriented X 3, no focal deficits  Skin: intact, no rashes or lesions     LABS / EKG        Labs  Labs are pending.    Lab Results   Component Value Date     (L) 12/27/2024    K 4.0 12/27/2024    CL 98 12/27/2024    BUN 16 12/27/2024    CREATININE 0.9 12/27/2024    WBC 4.70 12/27/2024    HGB 14.2 12/27/2024    HCT 40.1 12/27/2024     12/27/2024         ECG/Telemetry  I have independently reviewed the ECG. No significant findings.    ASSESSMENT AND PLAN         Encounter for pre-operative examination  Scheduled to have Left Reverse Total Shoulder Arthroplasty  by Dr Medellin " on 1/15/25  Complaining of left shoulder pain for the last 10 years and worse for the last 5 years   He had shoulder release and debridement of biceps tendon on and 2019  he denies any exertional chest pain or sob and his exercise tolerance is > 4 mets. He was cleared by dr Dias from cardiology  he has a history of CAD but no stroke  he denies any bowel disturbance but has urinary frequency  he denies any history of dvt/pe  he denies any snoring   EKG shows normal sinus rhythm with no st changes    Coronary artery disease involving native coronary artery of native heart without angina pectoris  Continue aspirin, statin, Toprol but hold losartan on the morning of surgery    Primary hypertension  Continue Toprol but hold losartan and HCTZ on the morning of surgery    Other hyperlipidemia  Continue lovastatin    BPH (benign prostatic hyperplasia)  Continue Flomax  He is at risk of urinary retention postop and this can be minimized by allowing early ambulation and avoiding constipation    Hypothyroidism  Continue levothyroxine    PAF (paroxysmal atrial fibrillation) (CMS/HCC)  Status post cardioversion  Currently in sinus rhythm  Recommend monitoring on telemetry postop  Not on Eliquis-last time he took Eliquis was in September 2023    Aneurysm of ascending aorta without rupture (CMS/HCC)  Under surveillance  Strict bp control    Primary osteoarthritis of left shoulder  Scheduled for reverse shoulder arthroplasty    Hyponatremia  Na is 131- secondary to hctz  Advised to increase salt intake and needs repeat bmp on the am of surgery     In regards to perioperative cardiac risk:  Regarding perioperative cardiovascular risk:  The patient has the following risk factors: history of ischemic heart disease.  This correlates to a rCRI score of 1 with perioperative cardiac event risk of 0.9%.  Shoulder arthroplasty is an intermediate risk procedure and he is at acceptable risk for surgery.  Patient needs repeat BMP on the  morning of surgery secondary to hyponatremia    Further comments:  Resume supplements when OK with surgical team.  I would encourage incentive spirometry to assist with minimizing sabi-operative pulmonary risk.  DVT prophylaxis and timing of such per the discretion of the surgeon.     Please do not hesitate to contact [facility] during the upcoming hospitalization with any questions or concerns.     Romie Dias MD  12/27/2024

## 2025-01-13 NOTE — DISCHARGE INSTRUCTIONS
Your blood pressure was on the lower side after surgery. This is common and likely caused by anesthesia, pain medications, immobility. Your BP will usually return to your normal after a few days. Please check your BP at home  prior to taking medications. If it is above 110, you make take your metoprolol and losartan. If it is less than 110, please do NOT take them. Hold your hydrochlorothiazide for a few days. Starting next week, if your BP is above 110 while taking metoprolol and losartan, you may restart your hydrochlorothiazide. If your BP remains in the lower side next week, follow-up with your PCP.         Procedure name: Reverse Ball and Socket replacement - Vignesh     Symptoms to call for instructions  The following are CONCERNING SYMPTOMS after a reverse shoulder replacement    PLEASE CALL YOUR DOCTOR IF:                * You have excessive redness of the incision.              * You have drainage for more than 4 days.              * You have a fever greater than 101.5 degrees Fahrenheit.    Activity restrictions instructions  The following are ACTIVITY RESTRICTIONS after a reverse shoulder replacement    * A sling has been provided for you.  * After the first 48 to 72 hours, you may remove the sling to bathe and dress and perform aline ctivities taught to you prior to discharge. Otherwise, the sling should be worn.    Wound care instructions  The following are INCISION CARE instructions after a reverse shoulder replacement           * Use ice on the shoulder intermittently over the first 48 hours after surgery                    (20 minutes on and 20 minutes off).  If you have a beige tape dressing, you may remove it after 2 days.  If you have a clear plastic dressing, it is waterproof and should not be removed  * You may shower immediately with the waterproof dressing. Otherwise after the 5th day.  The incision site can get wet after day 5 but CANNOT be submerged under water until your sutures/staples are  removed.  * Do not rub the incision.  Make sure your axilla (armpit) is completely dry after showering.    Additional supplement instructions  In addition,    * Pain medication has been prescribed for you.  Take a stool softener (Colace, Dulcolax or Senakot) if you are using narcotic pain medications.  * Use your medication liberally as directed over the first 48 hours, and then begin to taper the use of your narcotic.  You may take Extra Strength Tylenol or Tylenol in addition to the narcotic pills early in the postoperative period and in place of them while titrating off of the narcotics.  * DO NOT take ANY nonsteroidal anti-inflammatory pain medications: Advil, Motrin, Ibuprofen, Aleve, Naproxen, or Naprosyn.      * Take one 81mg aspirin once a day for 6 weeks after surgery, unless you have an aspirin sensitivity /allergy or asthma.      * Please call [phone] or [phone] for any problems.  * Please call [phone] to make a follow-up appointment.  You should see the doctor 10-14 days after your surgery.

## 2025-01-14 ENCOUNTER — ANESTHESIA EVENT (OUTPATIENT)
Dept: OPERATING ROOM | Facility: HOSPITAL | Age: 70
Setting detail: HOSPITAL OUTPATIENT SURGERY
End: 2025-01-14
Payer: COMMERCIAL

## 2025-01-15 ENCOUNTER — APPOINTMENT (OUTPATIENT)
Dept: RADIOLOGY | Facility: HOSPITAL | Age: 70
End: 2025-01-15
Attending: NURSE PRACTITIONER
Payer: COMMERCIAL

## 2025-01-15 ENCOUNTER — ANESTHESIA (OUTPATIENT)
Dept: OPERATING ROOM | Facility: HOSPITAL | Age: 70
Setting detail: HOSPITAL OUTPATIENT SURGERY
End: 2025-01-15
Payer: COMMERCIAL

## 2025-01-15 ENCOUNTER — HOSPITAL ENCOUNTER (OUTPATIENT)
Facility: HOSPITAL | Age: 70
Discharge: HOME | End: 2025-01-16
Attending: ORTHOPAEDIC SURGERY | Admitting: ORTHOPAEDIC SURGERY
Payer: COMMERCIAL

## 2025-01-15 DIAGNOSIS — Z96.612 S/P REVERSE TOTAL SHOULDER ARTHROPLASTY, LEFT: Primary | ICD-10-CM

## 2025-01-15 LAB
ABO + RH BLD: NORMAL
ANION GAP SERPL CALC-SCNC: 8 MEQ/L (ref 3–15)
BUN SERPL-MCNC: 16 MG/DL (ref 7–25)
CALCIUM SERPL-MCNC: 9.5 MG/DL (ref 8.6–10.3)
CHLORIDE SERPL-SCNC: 101 MEQ/L (ref 98–107)
CO2 SERPL-SCNC: 24 MEQ/L (ref 21–31)
CREAT SERPL-MCNC: 1 MG/DL (ref 0.7–1.3)
D AG BLD QL: POSITIVE
EGFRCR SERPLBLD CKD-EPI 2021: >60 ML/MIN/1.73M*2
GLUCOSE BLD-MCNC: 107 MG/DL (ref 70–99)
GLUCOSE SERPL-MCNC: 113 MG/DL (ref 70–99)
POCT TEST: ABNORMAL
POTASSIUM SERPL-SCNC: 4.4 MEQ/L (ref 3.5–5.1)
SODIUM SERPL-SCNC: 133 MEQ/L (ref 136–145)

## 2025-01-15 PROCEDURE — 63600000 HC DRUGS/DETAIL CODE: Performed by: ANESTHESIOLOGY

## 2025-01-15 PROCEDURE — 25000000 HC PHARMACY GENERAL: Performed by: ANESTHESIOLOGY

## 2025-01-15 PROCEDURE — 63600000 HC DRUGS/DETAIL CODE: Mod: JZ | Performed by: ORTHOPAEDIC SURGERY

## 2025-01-15 PROCEDURE — 25800000 HC PHARMACY IV SOLUTIONS: Performed by: ANESTHESIOLOGY

## 2025-01-15 PROCEDURE — 71000001 HC PACU PHASE 1 INITIAL 30MIN: Performed by: ORTHOPAEDIC SURGERY

## 2025-01-15 PROCEDURE — 36415 COLL VENOUS BLD VENIPUNCTURE: CPT | Performed by: ORTHOPAEDIC SURGERY

## 2025-01-15 PROCEDURE — 80048 BASIC METABOLIC PNL TOTAL CA: CPT | Performed by: ORTHOPAEDIC SURGERY

## 2025-01-15 PROCEDURE — 71000011 HC PACU PHASE 1 EA ADDL MIN: Performed by: ORTHOPAEDIC SURGERY

## 2025-01-15 PROCEDURE — 63600000 HC DRUGS/DETAIL CODE: Mod: JZ | Performed by: NURSE PRACTITIONER

## 2025-01-15 PROCEDURE — 36000005 HC OR LEVEL 5 INITIAL 30MIN: Performed by: ORTHOPAEDIC SURGERY

## 2025-01-15 PROCEDURE — 63700000 HC SELF-ADMINISTRABLE DRUG: Performed by: NURSE PRACTITIONER

## 2025-01-15 PROCEDURE — C1713 ANCHOR/SCREW BN/BN,TIS/BN: HCPCS | Performed by: ORTHOPAEDIC SURGERY

## 2025-01-15 PROCEDURE — 25000000 HC PHARMACY GENERAL: Performed by: NURSE ANESTHETIST, CERTIFIED REGISTERED

## 2025-01-15 PROCEDURE — 99232 SBSQ HOSP IP/OBS MODERATE 35: CPT | Performed by: HOSPITALIST

## 2025-01-15 PROCEDURE — 63700000 HC SELF-ADMINISTRABLE DRUG

## 2025-01-15 PROCEDURE — 37000001 HC ANESTHESIA GENERAL: Performed by: ORTHOPAEDIC SURGERY

## 2025-01-15 PROCEDURE — 25000000 HC PHARMACY GENERAL: Performed by: ORTHOPAEDIC SURGERY

## 2025-01-15 PROCEDURE — 27200000 HC STERILE SUPPLY: Performed by: ORTHOPAEDIC SURGERY

## 2025-01-15 PROCEDURE — C1776 JOINT DEVICE (IMPLANTABLE): HCPCS | Performed by: ORTHOPAEDIC SURGERY

## 2025-01-15 PROCEDURE — 0RRK00Z REPLACEMENT OF LEFT SHOULDER JOINT WITH REVERSE BALL AND SOCKET SYNTHETIC SUBSTITUTE, OPEN APPROACH: ICD-10-PCS | Performed by: ORTHOPAEDIC SURGERY

## 2025-01-15 PROCEDURE — 73020 X-RAY EXAM OF SHOULDER: CPT | Mod: LT

## 2025-01-15 PROCEDURE — 0LS40ZZ REPOSITION LEFT UPPER ARM TENDON, OPEN APPROACH: ICD-10-PCS | Performed by: ORTHOPAEDIC SURGERY

## 2025-01-15 PROCEDURE — 25800000 HC PHARMACY IV SOLUTIONS: Performed by: NURSE PRACTITIONER

## 2025-01-15 PROCEDURE — 63600000 HC DRUGS/DETAIL CODE: Mod: JZ | Performed by: NURSE ANESTHETIST, CERTIFIED REGISTERED

## 2025-01-15 PROCEDURE — 36000015 HC OR LEVEL 5 EA ADDL MIN: Performed by: ORTHOPAEDIC SURGERY

## 2025-01-15 DEVICE — SCREW 5.0 X 26MM RSP LOCKING: Type: IMPLANTABLE DEVICE | Site: SHOULDER | Status: FUNCTIONAL

## 2025-01-15 DEVICE — SCREW 5.0 X 14MM RSP LOCKING: Type: IMPLANTABLE DEVICE | Site: SHOULDER | Status: FUNCTIONAL

## 2025-01-15 DEVICE — RSP HUMERAL SOCKET INSERT, 36MM +4MM, STANDARD HXE-PLUS
Type: IMPLANTABLE DEVICE | Site: SHOULDER | Status: FUNCTIONAL
Brand: DJO SURGICAL

## 2025-01-15 DEVICE — ALTIVATE REVERSE, HUMERAL STEM, STANDARD SHELL, SZ 12X48MM
Type: IMPLANTABLE DEVICE | Site: SHOULDER | Status: FUNCTIONAL
Brand: DJO SURGICAL

## 2025-01-15 DEVICE — SCREW 5.0 X 22MM RSP LOCKING: Type: IMPLANTABLE DEVICE | Site: SHOULDER | Status: FUNCTIONAL

## 2025-01-15 DEVICE — GLENOID, HEAD W/RETAINING SCREW, RSP, 36MM, NEUTRAL
Type: IMPLANTABLE DEVICE | Site: SHOULDER | Status: FUNCTIONAL
Brand: DJO SURGICAL

## 2025-01-15 DEVICE — RSP BASEPLATE, 30MM, W/P2 COATING
Type: IMPLANTABLE DEVICE | Site: SHOULDER | Status: FUNCTIONAL
Brand: DJO SURGICAL

## 2025-01-15 RX ORDER — SCOPOLAMINE 1 MG/3D
PATCH, EXTENDED RELEASE TRANSDERMAL
Status: DISCONTINUED
Start: 2025-01-15 | End: 2025-01-16 | Stop reason: HOSPADM

## 2025-01-15 RX ORDER — SCOPOLAMINE 1 MG/3D
1 PATCH, EXTENDED RELEASE TRANSDERMAL ONCE
Status: DISCONTINUED | OUTPATIENT
Start: 2025-01-15 | End: 2025-01-16 | Stop reason: HOSPADM

## 2025-01-15 RX ORDER — TAMSULOSIN HYDROCHLORIDE 0.4 MG/1
0.4 CAPSULE ORAL NIGHTLY
Status: DISCONTINUED | OUTPATIENT
Start: 2025-01-15 | End: 2025-01-16 | Stop reason: HOSPADM

## 2025-01-15 RX ORDER — POLYETHYLENE GLYCOL 3350 17 G/17G
17 POWDER, FOR SOLUTION ORAL DAILY
Status: DISCONTINUED | OUTPATIENT
Start: 2025-01-15 | End: 2025-01-16 | Stop reason: HOSPADM

## 2025-01-15 RX ORDER — PHENYLEPHRINE HCL IN 0.9% NACL 50MG/250ML
PLASTIC BAG, INJECTION (ML) INTRAVENOUS CONTINUOUS PRN
Status: DISCONTINUED | OUTPATIENT
Start: 2025-01-15 | End: 2025-01-15 | Stop reason: SURG

## 2025-01-15 RX ORDER — FENTANYL CITRATE 50 UG/ML
INJECTION, SOLUTION INTRAMUSCULAR; INTRAVENOUS AS NEEDED
Status: DISCONTINUED | OUTPATIENT
Start: 2025-01-15 | End: 2025-01-15 | Stop reason: SURG

## 2025-01-15 RX ORDER — LEVOTHYROXINE SODIUM 125 UG/1
125 TABLET ORAL
Status: DISCONTINUED | OUTPATIENT
Start: 2025-01-16 | End: 2025-01-16 | Stop reason: HOSPADM

## 2025-01-15 RX ORDER — SODIUM CHLORIDE 9 MG/ML
INJECTION, SOLUTION INTRAVENOUS CONTINUOUS PRN
Status: DISCONTINUED | OUTPATIENT
Start: 2025-01-15 | End: 2025-01-15 | Stop reason: SURG

## 2025-01-15 RX ORDER — HYDROMORPHONE HYDROCHLORIDE 1 MG/ML
0.5 INJECTION, SOLUTION INTRAMUSCULAR; INTRAVENOUS; SUBCUTANEOUS
Status: DISCONTINUED | OUTPATIENT
Start: 2025-01-15 | End: 2025-01-15 | Stop reason: HOSPADM

## 2025-01-15 RX ORDER — ATORVASTATIN CALCIUM 10 MG/1
10 TABLET, FILM COATED ORAL DAILY
Status: DISCONTINUED | OUTPATIENT
Start: 2025-01-15 | End: 2025-01-16 | Stop reason: HOSPADM

## 2025-01-15 RX ORDER — SODIUM CHLORIDE 9 MG/ML
INJECTION, SOLUTION INTRAVENOUS CONTINUOUS
Status: DISCONTINUED | OUTPATIENT
Start: 2025-01-15 | End: 2025-01-16 | Stop reason: HOSPADM

## 2025-01-15 RX ORDER — ONDANSETRON HYDROCHLORIDE 2 MG/ML
4 INJECTION, SOLUTION INTRAVENOUS
Status: DISCONTINUED | OUTPATIENT
Start: 2025-01-15 | End: 2025-01-15 | Stop reason: HOSPADM

## 2025-01-15 RX ORDER — ROPIVACAINE HYDROCHLORIDE 5 MG/ML
INJECTION, SOLUTION EPIDURAL; INFILTRATION; PERINEURAL AS NEEDED
Status: DISCONTINUED | OUTPATIENT
Start: 2025-01-15 | End: 2025-01-15 | Stop reason: SURG

## 2025-01-15 RX ORDER — CEFAZOLIN SODIUM 2 G/50ML
2 SOLUTION INTRAVENOUS
Status: COMPLETED | OUTPATIENT
Start: 2025-01-15 | End: 2025-01-16

## 2025-01-15 RX ORDER — PANTOPRAZOLE SODIUM 40 MG/1
40 TABLET, DELAYED RELEASE ORAL DAILY
Status: DISCONTINUED | OUTPATIENT
Start: 2025-01-16 | End: 2025-01-16 | Stop reason: HOSPADM

## 2025-01-15 RX ORDER — OXYCODONE HYDROCHLORIDE 5 MG/1
5-10 TABLET ORAL EVERY 4 HOURS PRN
Status: DISCONTINUED | OUTPATIENT
Start: 2025-01-15 | End: 2025-01-16 | Stop reason: HOSPADM

## 2025-01-15 RX ORDER — CEFAZOLIN SODIUM 2 G/50ML
SOLUTION INTRAVENOUS
Status: DISPENSED
Start: 2025-01-15 | End: 2025-01-15

## 2025-01-15 RX ORDER — ONDANSETRON HYDROCHLORIDE 2 MG/ML
4 INJECTION, SOLUTION INTRAVENOUS EVERY 6 HOURS PRN
Status: DISCONTINUED | OUTPATIENT
Start: 2025-01-15 | End: 2025-01-16 | Stop reason: HOSPADM

## 2025-01-15 RX ORDER — ACETAMINOPHEN 325 MG/1
650 TABLET ORAL EVERY 4 HOURS PRN
Status: DISCONTINUED | OUTPATIENT
Start: 2025-01-15 | End: 2025-01-16 | Stop reason: HOSPADM

## 2025-01-15 RX ORDER — NAPROXEN SODIUM 220 MG/1
81 TABLET, FILM COATED ORAL DAILY
Status: DISCONTINUED | OUTPATIENT
Start: 2025-01-16 | End: 2025-01-16 | Stop reason: HOSPADM

## 2025-01-15 RX ORDER — LOSARTAN POTASSIUM 50 MG/1
50 TABLET ORAL 2 TIMES DAILY
Status: DISCONTINUED | OUTPATIENT
Start: 2025-01-16 | End: 2025-01-16 | Stop reason: HOSPADM

## 2025-01-15 RX ORDER — PHENYLEPHRINE HYDROCHLORIDE 10 MG/ML
INJECTION INTRAVENOUS AS NEEDED
Status: DISCONTINUED | OUTPATIENT
Start: 2025-01-15 | End: 2025-01-15 | Stop reason: SURG

## 2025-01-15 RX ORDER — DEXAMETHASONE SODIUM PHOSPHATE 4 MG/ML
INJECTION, SOLUTION INTRA-ARTICULAR; INTRALESIONAL; INTRAMUSCULAR; INTRAVENOUS; SOFT TISSUE AS NEEDED
Status: DISCONTINUED | OUTPATIENT
Start: 2025-01-15 | End: 2025-01-15 | Stop reason: SURG

## 2025-01-15 RX ORDER — DEXTROSE 50 % IN WATER (D50W) INTRAVENOUS SYRINGE
25 AS NEEDED
Status: DISCONTINUED | OUTPATIENT
Start: 2025-01-15 | End: 2025-01-16 | Stop reason: HOSPADM

## 2025-01-15 RX ORDER — FENTANYL CITRATE 50 UG/ML
50 INJECTION, SOLUTION INTRAMUSCULAR; INTRAVENOUS EVERY 5 MIN PRN
Status: DISCONTINUED | OUTPATIENT
Start: 2025-01-15 | End: 2025-01-15 | Stop reason: HOSPADM

## 2025-01-15 RX ORDER — MIDAZOLAM HYDROCHLORIDE 2 MG/2ML
INJECTION, SOLUTION INTRAMUSCULAR; INTRAVENOUS AS NEEDED
Status: DISCONTINUED | OUTPATIENT
Start: 2025-01-15 | End: 2025-01-15 | Stop reason: SURG

## 2025-01-15 RX ORDER — ALUMINUM HYDROXIDE, MAGNESIUM HYDROXIDE, AND SIMETHICONE 1200; 120; 1200 MG/30ML; MG/30ML; MG/30ML
30 SUSPENSION ORAL EVERY 4 HOURS PRN
Status: DISCONTINUED | OUTPATIENT
Start: 2025-01-15 | End: 2025-01-16 | Stop reason: HOSPADM

## 2025-01-15 RX ORDER — PROPOFOL 10 MG/ML
INJECTION, EMULSION INTRAVENOUS AS NEEDED
Status: DISCONTINUED | OUTPATIENT
Start: 2025-01-15 | End: 2025-01-15 | Stop reason: SURG

## 2025-01-15 RX ORDER — IBUPROFEN 200 MG
16-32 TABLET ORAL AS NEEDED
Status: DISCONTINUED | OUTPATIENT
Start: 2025-01-15 | End: 2025-01-16 | Stop reason: HOSPADM

## 2025-01-15 RX ORDER — CEFAZOLIN SODIUM 2 G/50ML
2 SOLUTION INTRAVENOUS ONCE
Status: COMPLETED | OUTPATIENT
Start: 2025-01-15 | End: 2025-01-15

## 2025-01-15 RX ORDER — EPHEDRINE SULFATE 50 MG/ML
INJECTION, SOLUTION INTRAVENOUS AS NEEDED
Status: DISCONTINUED | OUTPATIENT
Start: 2025-01-15 | End: 2025-01-15 | Stop reason: SURG

## 2025-01-15 RX ORDER — BISACODYL 10 MG/1
10 SUPPOSITORY RECTAL DAILY PRN
Status: DISCONTINUED | OUTPATIENT
Start: 2025-01-15 | End: 2025-01-16 | Stop reason: HOSPADM

## 2025-01-15 RX ORDER — AMOXICILLIN 250 MG
1 CAPSULE ORAL 2 TIMES DAILY
Status: DISCONTINUED | OUTPATIENT
Start: 2025-01-15 | End: 2025-01-16 | Stop reason: HOSPADM

## 2025-01-15 RX ORDER — ONDANSETRON HYDROCHLORIDE 2 MG/ML
INJECTION, SOLUTION INTRAVENOUS AS NEEDED
Status: DISCONTINUED | OUTPATIENT
Start: 2025-01-15 | End: 2025-01-15 | Stop reason: SURG

## 2025-01-15 RX ORDER — DIPHENHYDRAMINE HCL 25 MG
25 CAPSULE ORAL EVERY 6 HOURS PRN
Status: DISCONTINUED | OUTPATIENT
Start: 2025-01-15 | End: 2025-01-16 | Stop reason: HOSPADM

## 2025-01-15 RX ORDER — ROCURONIUM BROMIDE 10 MG/ML
INJECTION, SOLUTION INTRAVENOUS AS NEEDED
Status: DISCONTINUED | OUTPATIENT
Start: 2025-01-15 | End: 2025-01-15 | Stop reason: SURG

## 2025-01-15 RX ORDER — DEXTROSE 40 %
15-30 GEL (GRAM) ORAL AS NEEDED
Status: DISCONTINUED | OUTPATIENT
Start: 2025-01-15 | End: 2025-01-16 | Stop reason: HOSPADM

## 2025-01-15 RX ADMIN — DEXAMETHASONE SODIUM PHOSPHATE 4 MG: 4 INJECTION, SOLUTION INTRA-ARTICULAR; INTRALESIONAL; INTRAMUSCULAR; INTRAVENOUS; SOFT TISSUE at 14:56

## 2025-01-15 RX ADMIN — PHENYLEPHRINE HYDROCHLORIDE 150 MCG: 10 INJECTION INTRAVENOUS at 14:43

## 2025-01-15 RX ADMIN — ROCURONIUM BROMIDE 80 MG: 10 INJECTION, SOLUTION INTRAVENOUS at 14:26

## 2025-01-15 RX ADMIN — PHENYLEPHRINE HYDROCHLORIDE 150 MCG: 10 INJECTION INTRAVENOUS at 15:34

## 2025-01-15 RX ADMIN — PHENYLEPHRINE HYDROCHLORIDE 35 MCG/MIN: 50 INJECTION INTRAVENOUS at 14:48

## 2025-01-15 RX ADMIN — EPHEDRINE SULFATE 10 MG: 50 INJECTION, SOLUTION INTRAVENOUS at 14:43

## 2025-01-15 RX ADMIN — SODIUM CHLORIDE: 0.9 INJECTION, SOLUTION INTRAVENOUS at 14:02

## 2025-01-15 RX ADMIN — ONDANSETRON 4 MG: 2 INJECTION INTRAMUSCULAR; INTRAVENOUS at 17:00

## 2025-01-15 RX ADMIN — CEFAZOLIN SODIUM 2 G: 2 SOLUTION INTRAVENOUS at 22:49

## 2025-01-15 RX ADMIN — FENTANYL CITRATE 100 MCG: 50 INJECTION INTRAMUSCULAR; INTRAVENOUS at 14:20

## 2025-01-15 RX ADMIN — SCOPOLAMINE 1 PATCH: 1 PATCH, EXTENDED RELEASE TRANSDERMAL at 13:53

## 2025-01-15 RX ADMIN — CEFAZOLIN SODIUM 2 G: 2 SOLUTION INTRAVENOUS at 14:56

## 2025-01-15 RX ADMIN — SCOPOLAMINE 1 PATCH: 1.5 PATCH, EXTENDED RELEASE TRANSDERMAL at 13:53

## 2025-01-15 RX ADMIN — SUGAMMADEX 200 MG: 100 INJECTION, SOLUTION INTRAVENOUS at 17:00

## 2025-01-15 RX ADMIN — TAMSULOSIN HYDROCHLORIDE 0.4 MG: 0.4 CAPSULE ORAL at 21:52

## 2025-01-15 RX ADMIN — ROPIVACAINE HYDROCHLORIDE 25 ML: 5 INJECTION, SOLUTION EPIDURAL; INFILTRATION; PERINEURAL at 14:14

## 2025-01-15 RX ADMIN — METOPROLOL SUCCINATE ER TABLETS 75 MG: 50 TABLET, FILM COATED, EXTENDED RELEASE ORAL at 20:04

## 2025-01-15 RX ADMIN — MIDAZOLAM HYDROCHLORIDE 2 MG: 1 INJECTION, SOLUTION INTRAMUSCULAR; INTRAVENOUS at 14:07

## 2025-01-15 RX ADMIN — ATORVASTATIN CALCIUM 10 MG: 10 TABLET, FILM COATED ORAL at 20:03

## 2025-01-15 RX ADMIN — PROPOFOL 250 MG: 10 INJECTION, EMULSION INTRAVENOUS at 14:25

## 2025-01-15 RX ADMIN — ROCURONIUM BROMIDE 20 MG: 10 INJECTION, SOLUTION INTRAVENOUS at 15:27

## 2025-01-15 RX ADMIN — PHENYLEPHRINE HYDROCHLORIDE 150 MCG: 10 INJECTION INTRAVENOUS at 15:47

## 2025-01-15 RX ADMIN — SODIUM CHLORIDE: 9 INJECTION, SOLUTION INTRAVENOUS at 21:53

## 2025-01-15 RX ADMIN — MIDAZOLAM HYDROCHLORIDE 2 MG: 1 INJECTION, SOLUTION INTRAMUSCULAR; INTRAVENOUS at 14:02

## 2025-01-15 RX ADMIN — OXYCODONE HYDROCHLORIDE 10 MG: 5 TABLET ORAL at 21:52

## 2025-01-15 RX ADMIN — SODIUM CHLORIDE: 0.9 INJECTION, SOLUTION INTRAVENOUS at 17:00

## 2025-01-15 ASSESSMENT — COGNITIVE AND FUNCTIONAL STATUS - GENERAL
CLIMB 3 TO 5 STEPS WITH RAILING: 3 - A LITTLE
STANDING UP FROM CHAIR USING ARMS: 3 - A LITTLE
MOVING TO AND FROM BED TO CHAIR: 3 - A LITTLE
WALKING IN HOSPITAL ROOM: 3 - A LITTLE

## 2025-01-15 ASSESSMENT — ENCOUNTER SYMPTOMS: DYSRHYTHMIAS: 1

## 2025-01-15 NOTE — ANESTHESIA PREPROCEDURE EVALUATION
Relevant Problems   CARDIOVASCULAR   (+) Coronary artery disease involving native coronary artery of native heart without angina pectoris   (+) PAF (paroxysmal atrial fibrillation) (CMS/HCC)   (+) Primary hypertension      MUSCULOSKELETAL   (+) Primary osteoarthritis of left shoulder      URINARY SYSTEM   (+) BPH (benign prostatic hyperplasia)      Other   (+) Hypothyroidism       Anesthesia ROS/MED HX    Anesthesia History - neg  Pulmonary    Sleep apnea (likely undiagnosed untreated VERONICA)  Neuro/Psych    Substance abuse (hx)  Cardiovascular   CAD   dyslipidemia   hypertension  Dysrhythmias (Parox A fib no recent episodes (> 1 yr per pt))   Cardiac clearance reviewed  Hematological - neg   Anticoagulants: Eliquis.  GI/Hepatic   GERD    Control: well controlled  Renal Disease   BPH   electrolyte problem (hyponatremia 133 chronic per patient)  Endo/Other   Hypothyroidism  Body Habitus: Obese  ROS/MED HX Comments:    Cardiology: Ascending aortic aneurysm 4.3 cm 1/2023    Cardiac clearance on chart from October  for foot surgery    Per cards note cath from 2016 with minimal plaque.    Per cards note echo 1/2023 with EF 65%, aorta 4.3 cm       Past Surgical History   Procedure Laterality Date    Arthrodesis Left     revision great toe    Cataract extraction w/  intraocular lens implant Bilateral 04/2024    Cervical fusion      C4, C5, C6    Distal clavicle excision Left     Elbow surgery Left     lateral release    Elbow surgery Right     lateral and medial release    Osteotomy Left 09/2023    5th metatarsal, arthrodesis great toe    Shoulder surgery Bilateral 2021    shoulder release and debridment biceps tendon    Spermatocelectomy Bilateral     Tenotomy Left     elbow    Umbilical hernia repair         Physical Exam    Airway   Mallampati: III  Cardiovascular - normal   Rhythm: regular   Rate: normalPulmonary - normal   clear to auscultation  Dental - normal        Anesthesia Plan    Plan: general and regional     Technique: general endotracheal and nerve block     Lines and Monitors: PIV     Airway: oral intubation and video laryngoscope    3 ASA  Blood Products:     Use of Blood Products Discussed: Yes     Consented to blood products  Anesthetic plan and risks discussed with: patient  Induction:    intravenous   Postop Plan:   Patient Disposition: inpatient floor planned admission   Pain Management: IV analgesics and interscalene block

## 2025-01-15 NOTE — ASSESSMENT & PLAN NOTE
Continue Flomax  He is at risk risk of urinary retention postop and this can be minimized by allowing early ambulation and avoiding constipation

## 2025-01-15 NOTE — CONSULTS
Hospital Medicine     Daily Progress Note                SUBJECTIVE   Interval History: Pt seen and examined in the pacu. Denies any CP or Sob, no dizziness or lightheadedness, no abd pain, no N/V.     OBJECTIVE      Vital signs in last 24 hours:  Temp:  [36 °C (96.8 °F)-36.3 °C (97.3 °F)] 36.3 °C (97.3 °F)  Heart Rate:  [79-85] 79  Resp:  [17-20] 17  BP: (139)/(85) 139/85    Intake/Output Summary (Last 24 hours) at 1/15/2025 1727  Last data filed at 1/15/2025 1700  Gross per 24 hour   Intake 1000 ml   Output 200 ml   Net 800 ml         PHYSICAL EXAMINATION      Physical Exam  Constitutional:       General: He is not in acute distress.     Appearance: He is not toxic-appearing.   HENT:      Head: Normocephalic and atraumatic.   Eyes:      Conjunctiva/sclera: Conjunctivae normal.   Cardiovascular:      Rate and Rhythm: Normal rate and regular rhythm.      Heart sounds: Normal heart sounds.   Pulmonary:      Effort: Pulmonary effort is normal.      Breath sounds: Normal breath sounds.   Abdominal:      General: Bowel sounds are normal. There is no distension.      Palpations: Abdomen is soft.      Tenderness: There is no abdominal tenderness.   Musculoskeletal:      Cervical back: Neck supple.      Comments: LUE in sling/immobilizer   Skin:     General: Skin is warm and dry.   Neurological:      Mental Status: He is alert and oriented to person, place, and time.   Psychiatric:         Mood and Affect: Mood normal.         Behavior: Behavior normal.            LINES, CATHETERS, DRAINS, AIRWAYS, AND WOUNDS   Lines, Drains, and Airways:  Wounds (agree with documentation and present on admission):  Peripheral IV (Adult) 01/15/25 Posterior;Right Hand (Active)   Number of days: 0       Surgical Incision Shoulder Anterior;Left (Active)   Number of days: 0         Comments:    LABS / IMAGING / TELE      Labs  Preop labs reviewed      Imaging  N/a    ECG/Telemetry  NSR on tele in pacu    ASSESSMENT AND PLAN         Assessment & Plan  S/P reverse total shoulder arthroplasty, left  Postop shoulder care, PT/OT, DVT prophylaxis and pain control per orthopedics team  Encourage incentive spirometry  Recommend bowel regimen  Monitor postop labs  Coronary artery disease involving native coronary artery of native heart without angina pectoris  Continue aspirin, statin  Continue Toprol with hold parameters  Primary hypertension  Continue metoprolol and losartan with hold parameters  Hold HCTZ for now  Monitor BP  Other hyperlipidemia  Continue statin  BPH (benign prostatic hyperplasia)  Continue Flomax  He is at risk risk of urinary retention postop and this can be minimized by allowing early ambulation and avoiding constipation  Hypothyroidism  Continue levothyroxine  PAF (paroxysmal atrial fibrillation) (CMS/HCC)  Status post cardioversion  Currently in sinus rhythm  Continue Toprol  Recommend monitoring on telemetry postop for 24 hours  Not on Eliquis-last time he took Eliquis was in September 2023  Aneurysm of ascending aorta without rupture (CMS/HCC)  Under surveillance  Strict bp control    VTE Assessment: Padua    VTE Prophylaxis:  Current anticoagulants:    None      Code Status: No Order      Estimated Discharge Date: 1/16/2025   Disposition Planning: per ortho team          Clifford Santillan MD  1/15/2025

## 2025-01-15 NOTE — ANESTHESIA PROCEDURE NOTES
Peripheral Block    Patient location during procedure: OR  Start time: 1/15/2025 2:09 PM  End time: 1/15/2025 2:14 PM  Reason for block: at surgeon's request and post-op pain management  Staffing  Performed: anesthesiologist   Anesthesiologist: Delores Issa MD  Performed by: Delores Issa MD  Authorized by: Delores Issa MD    Preanesthetic Checklist  Completed: patient identified, site marked, surgical consent, pre-op evaluation, timeout performed, IV checked, risks and benefits discussed, monitors and equipment checked and sterile field maintained during procedure  Peripheral Block  Patient position: semi supine.  Prep: ChloraPrep  Patient monitoring: heart rate, continuous pulse ox and blood pressure  Block type: interscalene  Laterality: left  Injection technique: single-shot      Guidance: nerve stimulator and ultrasound guided  Image captured and stored.  Image visualization comments : Ultrasound used to visualize medication disbursement around appropriate nerve structures.      Local infiltration: ropivacaine  Infiltration strength: 0.5 %  Dose: 25 mL  Needle  Needle gauge: 22 G  Needle length: 3 1/8 in  Needle localization: nerve stimulator and ultrasound guidance  Assessment  Injection assessment: negative aspiration for heme, no paresthesia on injection, incremental injection and local visualized surrounding nerve on ultrasound  Heart rate change: no  Slow fractionated injection: yes  Additional Notes  Twitch present at 1 mA, disappeared at 0.5 mA

## 2025-01-15 NOTE — PROGRESS NOTES
Orthopaedic Surgery Progress Note    Interval History  Patient is resting comfortably in bed. Pain currently well controlled. Patient responds to questions appropriately and follows commands.     Physical Exam:   General:   No acute distress   Symmetric chest rise bilaterally with normal effort     Musculoskeletal:   Left Upper Extremity  Inspection: Surgical incisions without erythema or drainage.   Motor and sensation limited in setting of preop block  Vascular: Palpable radial pulse. Brisk capillary refill   Sling in place    Assessment and Plan   Yury Nguyen is a 69 y.o. male who is status post L rTSA    -- POD: Day of Surgery   -- WBS: NWB LUE  -- ROM: 140 FF /40 ER  -- Brace: Sling in place   -- Analgesia   -- DVT PPX   -- PT OT recs appreciated   -- [facility] recs appreciated  -- Anticipate 1 night stay    John Perez MD   Orthopaedic Surgery

## 2025-01-15 NOTE — ASSESSMENT & PLAN NOTE
Noted on preop labs  NA today preop 133  Avoid NSAID use, nephrotoxins  Encourage adequate p.o. intake/hydration  Follow repeat BMP in a.m.

## 2025-01-15 NOTE — ANESTHESIOLOGIST PRE-PROCEDURE ATTESTATION
Pre-Procedure Patient Identification:  I am the Primary Anesthesiologist and have identified the patient on 01/15/25 at 12:33 PM.   I have confirmed the procedure(s) will be performed by the following surgeon/proceduralist Bradley Medellin MD.

## 2025-01-15 NOTE — ASSESSMENT & PLAN NOTE
Postop shoulder care, PT/OT, DVT prophylaxis and pain control per orthopedics team  Encourage incentive spirometry  Recommend bowel regimen  Monitor postop labs

## 2025-01-15 NOTE — OP NOTE
Operative report     Date of operation: 1/15/2025.    Preoperative diagnosis: Glenohumeral arthritis left shoulder M19.012, partial rotator cuff tear left shoulder with atrophy M75.112, biceps tendinitis left shoulder M75.22.    Postoperative diagnosis: Same    Procedure: Reverse total shoulder replacement with biceps tenodesis 12293    Surgeon:  Bradley Medellin Jr MD    Assistant: Gavin Perez MD    Anesthesia:   General With block.    Estimated blood loss: 200 cc.    Complications: None    Indications : The patient is a 69-year-old male with end-stage glenohumeral arthritis with a painful stiff shoulder, partial rotator cuff tear with atrophy, and biceps tendinitis.  He has failed nonoperative management and is brought to the operating for above procedure.    Findings: At the time of surgery he had 20 degrees of external rotation with his arm at his side.  He had a partial tear of the anterior portion of supraspinatus in addition to the upper portion of subscapularis.  However, I was still able to do a lesser tuberosity osteotomy.  The anterior and inferior capsule was excised.  The remaining capsule was released.  We placed a DJO 36 neutral glenoid sphere slightly inferiorly tilted and all screws had excellent purchase.  We placed a DJO short stem size 12 with a +4 liner and 30 degrees of retroversion.  We used a 36+4.  We placed that in 30 degrees of retroversion.  At the completion of the procedure with the lesser tuberosity repaired in the rotator interval repair laterally the patient had 40 to 45 degrees of external rotation with his arm at his side and 160 of elevation.  The biceps was tenodesed distally and resected proximally.  The cephalic vein was preserved.  We did not use a drain.    Description of procedure:  After proper written consent was obtained the patient was brought to the operating room and placed on the operating table in supine position.  After adequate anesthesia had been obtained,, the  patient's shoulder was prepped and draped in normal sterile fashion.  A 10-12 cm deltopectoral incision was made.  The incision was carried sharply down to the deep fascia.  Cephalic vein and deltoid were taken laterally while the pectoralis major was taken medially.  Clavipectoral fascia was incised just lateral to the conjoined tendon.  This incision was carried up to not into the coracoacromial ligament.  Digital palpation was used to verify the integrity of the axillary nerve, which was protected throughout the procedure.  The musculocutaneous nerve was not within our surgical field.  We retracted conjoined tendon medially and deltoid laterally.  The anterior humeral circumflex vessels were clamped and coagulated.  Biceps was uncovered from the upper border of pectoralis major to the supraglenoid tubercle and was then tenodesed to the upper border of pectoralis major with 2 Ethibond sutures.  It was then released just proximal to this tenodesis site and was then resected from the supraglenoid tubercle.  A large curved osteotome was used to perform a lesser tuberosity osteotomy.  The lesser tuberosity and subscapularis reflected off the anterior capsule and 3, 1 mm Dacron tape sutures were passed around the bone tendon junction and clamped.  Subscapularis and lesser tuberosity were then retracted medially.  The anterior capsule was released off the anatomic neck starting superiorly and extending all the way down past the 6 o'clock position, taking care to protect the axillary nerve.  This allowed us to deliver the humerus into the wound was simultaneous abduction extension and external rotation.  All humeral osteophytes were removed and the anatomic neck was marked.  The anatomic neck was then cut in 30° of retroversion leaving 2 or 3 mm of bone medial to the rotator cuff for flexion.  The humerus was then retracted posteriorly with a large Fukuda retractor.  The anterior and inferior capsule was excised and a  reverse double-pronged Bankart retractor was placed on the anterior neck of the scapula.  The labrum was excised circumferentially and the remaining capsule was released.  A blunt Hohmann was placed posterior superiorly.  I then drilled a hole for the tap approximately 15 mm superior from the most inferior extent of the native glenoid slightly inferiorly tilted in native version.  The drill exited in the appropriate location on the anterior neck of the scapula.  I then removed the drill and placed a tap to the appropriate depth.  We then reamed over the tap until we had concentric surface.  The tap was then removed.  The baseplate was then placed and had excellent purchase.  We then placed all 4 locking screws and all of them had excellent purchase before engaging the plate.  We then irrigated and dried the taper.  I then used a peripheral reamer to ream around the periphery of the baseplate and dried the taper once again.  We then impacted the glenoid sphere onto the taper.  Once it was impacted, I attempted to remove it with a small Silverman elevator and it was solidly fixed.  I hit it several more times with an impactor and mallet and then placed a central locking screw.      The humerus was then redelivered into the wound.  We reamed distally.  We then reamed the proximal epiphysis with the appropriate size reamer.  We then placed the humeral component in 30° of retroversion with a nonabsorbable suture around its neck.  Once it was impacted into position we had excellent stability.  We then trialed the liners and picked the appropriate liner.  We reduced the humerus we had excellent stability with no impingement.  I therefore then re-deliver the humerus into the wound and removed the trial liner.  We then irrigated and dried the receptacle for the liner and impacted a real polyethylene liner into position.  Once this was completely seated, the humerus was then reduced.  Again we had excellent stability with no  impingement.  We then took the 2 free ends of the nonabsorbable suture and passed them through the bone tendon junction of the lesser tuberosity.  The deep limbs of the 3 previously placed sutures around the lesser tuberosity were then passed through the osteotomy bed and out the lateral cortex.  The most superior and inferior of the sutures were accompanied by the superior and inferior nonabsorbable sutures.  We then reduced the lesser tuberosity anatomically and tied the nonabsorbable sutures laterally thereby compressing the lesser tuberosity down to its footprint.  We then tied the interfragment fragmentary sutures.  I did not close the rotator interval.  Any tightness in the rotator interval at the upper border subscapularis was released and this gave us excellent motion.  Digital palpation was again used to verify the integrity of the axillary nerve.  We again copiously pulse irrigated.  The wound was then closed in standard fashion with 2-0 Vicryl in the subcutaneous tissue and running subcuticular 3-0 Monocryl for skin.  The patient was then placed in a sterile dressing and a sling.  The patient  tolerated the procedure well.      I was personally present for the key portions of the operation which included biceps tenodesis, lesser tuberosity osteotomy, humeral exposure and cutting, glenoid exposure, preparation, and placement of the glenoid component, evaluate to the placement of the humeral component, evaluation of stability and alignment of both components, and subscapularis repair.    Bradley Medellin Jr MD

## 2025-01-15 NOTE — ASSESSMENT & PLAN NOTE
Status post cardioversion  Currently in sinus rhythm  Continue Toprol  Recommend monitoring on telemetry postop for 24 hours  Not on Eliquis-last time he took Eliquis was in September 2023

## 2025-01-15 NOTE — BRIEF OP NOTE
Left Reverse Total Shoulder Arthroplasty (L) Procedure Note    Procedure:    Left Reverse Total Shoulder Arthroplasty  CPT(R) Code:  33841 - NM RECONSTR TOTAL SHOULDER IMPLANT      Pre-op Diagnosis      * Complete tear of left rotator cuff, unspecified whether traumatic [M75.122]     * Primary osteoarthritis of left shoulder [M19.012]       Post-op Diagnosis     * Complete tear of left rotator cuff, unspecified whether traumatic [M75.122]     * Primary osteoarthritis of left shoulder [M19.012]    Surgeons and Role:     * Bradley Medellin MD - Primary    Anesthesia: Choice    Staff:   Circulator: Giuseppe Chappell RN  Scrub Person: Kevan Mercado RN; Jevon Shin    Procedure Details   L rTSA. No complication    PATOS (Infection Present at Time of Surgery):    No    Estimated Blood Loss: 200 mL    Specimens:                Order Name Source Comment Collection Info Order Time   BASIC METABOLIC PANEL Blood, Venous  Collected By: Gissel Kirby RN 1/15/2025 10:43 AM     Release to patient   Immediate        REPEAT ABO/RH (TYPE CHECK) Blood, Venous  Collected By: Gissel Kirby RN 1/15/2025 10:43 AM     Release to patient   Immediate              Drains: * No LDAs found *    Implants:   Implant Name Type Inv. Item Serial No.  Lot No. LRB No. Used Action   GLENOID BASEPLATE 30MM RSP - [id number]  GLENOID BASEPLATE 30MM RSP  [id number] [id number] Left 1 Implanted   GLENOID HEAD W/RETAINING SCREW SZ 36MM NEUTRAL RSP - [id number]  GLENOID HEAD W/RETAINING SCREW SZ 36MM NEUTRAL RSP  [employer] [id number] Left 1 Implanted   SCREW 5.0 X 14MM RSP LOCKING - [id number]  SCREW 5.0 X 14MM RSP LOCKING  [employer] [id number] Left 1 Implanted   SCREW 5.0 X 14MM RSP LOCKING - [id number]  SCREW 5.0 X 14MM RSP LOCKING  [employer] [id number] Left 1 Implanted   SCREW 5.0 X 26MM RSP LOCKING - [id number]  SCREW 5.0 X 26MM RSP LOCKING  [employer] [id number]  Left 1 Implanted   SCREW 5.0 X 22MM RSP LOCKING - [id number]  SCREW 5.0 X 22MM RSP LOCKING  [employer] [id number] Left 1 Implanted   HUMERAL STEM SMALL SHELL 12 X 48MM POROUS COATED - [id number]  HUMERAL STEM SMALL SHELL 12 X 48MM POROUS COATED  [employer] [id number] Left 1 Implanted   HUMERAL SOCKET INSERT SZ 36/+4 RSP - [id number]  HUMERAL SOCKET INSERT SZ 36/+4 RSP  [employer] [id number] Left 1 Implanted              Complications:  None; patient tolerated the procedure well.           Disposition: PACU - hemodynamically stable.           Condition: stable    Bradley Medellin MD  Phone Number: [phone]

## 2025-01-15 NOTE — OR SURGEON
Pre-Procedure patient identification:  I am the primary operating surgeon/proceduralist and I have reviewed the applicable pathology reports and radiology studies for this procedure. I have identified the patient and confirmed laterality is left on 01/15/25 at 11:19 AM Bradley Medellin MD  Phone Number: [phone]

## 2025-01-15 NOTE — ANESTHESIA PROCEDURE NOTES
Airway  Urgency: elective    Start Time: 1/15/2025 2:29 PM  Airway not difficult    General Information and Staff    Patient location during procedure: OR  Anesthesiologist: Delores Issa MD  Performed: anesthesiologist   Performed by: Delores Issa MD  Authorized by: Delores Issa MD      Indications and Patient Condition  Indications for airway management: anesthesia  Sedation level: general  Preoxygenated: yes  Patient position: sniffing  Mask difficulty assessment: 1 - vent by mask    Final Airway Details  Final airway type: endotracheal airway      Successful airway: ETT  Cuffed: yes   Successful intubation technique: video laryngoscopy  Facilitating devices/methods: intubating stylet  Endotracheal tube insertion site: oral  Blade size: #4  ETT size (mm): 8.0  Cormack-Lehane Classification: grade I - full view of glottis  Placement verified by: chest auscultation and capnometry   Measured from: lips  ETT to lips (cm): 24  Number of attempts at approach: 1  Number of other approaches attempted: 0  Atraumatic airway insertion      Additional Comments  Glidescope 4

## 2025-01-15 NOTE — ANESTHESIA POSTPROCEDURE EVALUATION
Patient: Yury Nguyen    Procedure Summary       Date: 01/15/25 Room / Location: [facility] OR  / [facility] OR P[facility]    Anesthesia Start: 1404 Anesthesia Stop: 1717    Procedure: Left Reverse Total Shoulder Arthroplasty (Left: Shoulder) Diagnosis:       Complete tear of left rotator cuff, unspecified whether traumatic      Primary osteoarthritis of left shoulder      (Complete tear of left rotator cuff, unspecified whether traumatic [M75.122])      (Primary osteoarthritis of left shoulder [M19.012])    Surgeons: Bradley Medellin MD Responsible Provider: Delores Issa MD    Anesthesia Type: general, regional ASA Status: 3            Anesthesia Type: general, regional  PACU Vitals  1/15/2025 1707 - 1/15/2025 1720        1/15/2025  1715             Temp: 36.3 °C (97.3 °F)    Pulse: 79    Resp: 17    SpO2: 95 %              Anesthesia Post Evaluation    Pain management: adequate  Patient location during evaluation: PACU  Patient participation: complete - patient participated  Level of consciousness: awake and alert  Cardiovascular status: acceptable  Airway Patency: adequate  Respiratory status: acceptable  Hydration status: acceptable  Anesthetic complications: no

## 2025-01-16 VITALS
HEART RATE: 94 BPM | HEIGHT: 71 IN | RESPIRATION RATE: 18 BRPM | TEMPERATURE: 97.1 F | DIASTOLIC BLOOD PRESSURE: 62 MMHG | WEIGHT: 230 LBS | BODY MASS INDEX: 32.2 KG/M2 | SYSTOLIC BLOOD PRESSURE: 109 MMHG | OXYGEN SATURATION: 93 %

## 2025-01-16 PROBLEM — D62 ACUTE BLOOD LOSS AS CAUSE OF POSTOPERATIVE ANEMIA: Status: ACTIVE | Noted: 2025-01-16

## 2025-01-16 LAB
ANION GAP SERPL CALC-SCNC: 8 MEQ/L (ref 3–15)
BUN SERPL-MCNC: 14 MG/DL (ref 7–25)
CALCIUM SERPL-MCNC: 8.5 MG/DL (ref 8.6–10.3)
CHLORIDE SERPL-SCNC: 103 MEQ/L (ref 98–107)
CO2 SERPL-SCNC: 24 MEQ/L (ref 21–31)
CREAT SERPL-MCNC: 1 MG/DL (ref 0.7–1.3)
EGFRCR SERPLBLD CKD-EPI 2021: >60 ML/MIN/1.73M*2
ERYTHROCYTE [DISTWIDTH] IN BLOOD BY AUTOMATED COUNT: 12.1 % (ref 11.6–14.4)
GLUCOSE SERPL-MCNC: 110 MG/DL (ref 70–99)
HCT VFR BLD AUTO: 32.4 % (ref 40.1–51)
HGB BLD-MCNC: 10.7 G/DL (ref 13.7–17.5)
MCH RBC QN AUTO: 33.2 PG (ref 28–33.2)
MCHC RBC AUTO-ENTMCNC: 33 G/DL (ref 32.2–36.5)
MCV RBC AUTO: 100.6 FL (ref 83–98)
PLATELET # BLD AUTO: 149 K/UL (ref 150–350)
PMV BLD AUTO: 11.2 FL (ref 9.4–12.4)
POTASSIUM SERPL-SCNC: 4.3 MEQ/L (ref 3.5–5.1)
RBC # BLD AUTO: 3.22 M/UL (ref 4.5–5.8)
SODIUM SERPL-SCNC: 135 MEQ/L (ref 136–145)
WBC # BLD AUTO: 9.17 K/UL (ref 3.8–10.5)

## 2025-01-16 PROCEDURE — 97166 OT EVAL MOD COMPLEX 45 MIN: CPT | Mod: GO

## 2025-01-16 PROCEDURE — 97116 GAIT TRAINING THERAPY: CPT | Mod: GP

## 2025-01-16 PROCEDURE — 36415 COLL VENOUS BLD VENIPUNCTURE: CPT | Performed by: NURSE PRACTITIONER

## 2025-01-16 PROCEDURE — 97162 PT EVAL MOD COMPLEX 30 MIN: CPT | Mod: GP

## 2025-01-16 PROCEDURE — 97535 SELF CARE MNGMENT TRAINING: CPT | Mod: GO

## 2025-01-16 PROCEDURE — 80048 BASIC METABOLIC PNL TOTAL CA: CPT | Performed by: NURSE PRACTITIONER

## 2025-01-16 PROCEDURE — 85027 COMPLETE CBC AUTOMATED: CPT | Performed by: NURSE PRACTITIONER

## 2025-01-16 PROCEDURE — 99232 SBSQ HOSP IP/OBS MODERATE 35: CPT | Performed by: HOSPITALIST

## 2025-01-16 PROCEDURE — 63700000 HC SELF-ADMINISTRABLE DRUG: Performed by: NURSE PRACTITIONER

## 2025-01-16 PROCEDURE — 63600000 HC DRUGS/DETAIL CODE: Mod: JZ | Performed by: NURSE PRACTITIONER

## 2025-01-16 RX ORDER — LOSARTAN POTASSIUM 50 MG/1
50 TABLET ORAL 2 TIMES DAILY
Start: 2025-01-16

## 2025-01-16 RX ORDER — ACETAMINOPHEN 500 MG
1000 TABLET ORAL
Start: 2025-01-16

## 2025-01-16 RX ORDER — HYDROCHLOROTHIAZIDE 12.5 MG/1
12.5 TABLET ORAL DAILY
Start: 2025-01-16

## 2025-01-16 RX ORDER — OXYCODONE HYDROCHLORIDE 5 MG/1
5 TABLET ORAL EVERY 6 HOURS PRN
Start: 2025-01-16 | End: 2025-01-21

## 2025-01-16 RX ORDER — METOPROLOL SUCCINATE 50 MG/1
75 TABLET, EXTENDED RELEASE ORAL 2 TIMES DAILY
Start: 2025-01-16

## 2025-01-16 RX ORDER — ZOLPIDEM TARTRATE 6.25 MG/1
6.25 TABLET, FILM COATED, EXTENDED RELEASE ORAL NIGHTLY PRN
Start: 2025-01-16

## 2025-01-16 RX ADMIN — LOSARTAN POTASSIUM 50 MG: 50 TABLET, FILM COATED ORAL at 09:56

## 2025-01-16 RX ADMIN — CEFAZOLIN SODIUM 2 G: 2 SOLUTION INTRAVENOUS at 06:13

## 2025-01-16 RX ADMIN — POLYETHYLENE GLYCOL 3350 17 G: 17 POWDER, FOR SOLUTION ORAL at 08:13

## 2025-01-16 RX ADMIN — LEVOTHYROXINE SODIUM 125 MCG: 0.12 TABLET ORAL at 06:12

## 2025-01-16 RX ADMIN — PANTOPRAZOLE SODIUM 40 MG: 40 TABLET, DELAYED RELEASE ORAL at 08:13

## 2025-01-16 RX ADMIN — SENNOSIDES AND DOCUSATE SODIUM 1 TABLET: 50; 8.6 TABLET ORAL at 08:14

## 2025-01-16 RX ADMIN — METOPROLOL SUCCINATE ER TABLETS 75 MG: 50 TABLET, FILM COATED, EXTENDED RELEASE ORAL at 09:56

## 2025-01-16 RX ADMIN — ASPIRIN 81 MG CHEWABLE TABLET 81 MG: 81 TABLET CHEWABLE at 08:13

## 2025-01-16 RX ADMIN — OXYCODONE HYDROCHLORIDE 10 MG: 5 TABLET ORAL at 08:14

## 2025-01-16 RX ADMIN — OXYCODONE HYDROCHLORIDE 10 MG: 5 TABLET ORAL at 02:49

## 2025-01-16 ASSESSMENT — COGNITIVE AND FUNCTIONAL STATUS - GENERAL
STANDING UP FROM CHAIR USING ARMS: 4 - NONE
STANDING UP FROM CHAIR USING ARMS: 4 - NONE
EATING MEALS: 4 - NONE
MOVING TO AND FROM BED TO CHAIR: 4 - NONE
TOILETING: 4 - NONE
AFFECT: WFL
WALKING IN HOSPITAL ROOM: 4 - NONE
WALKING IN HOSPITAL ROOM: 4 - NONE
CLIMB 3 TO 5 STEPS WITH RAILING: 4 - NONE
HELP NEEDED FOR BATHING: 3 - A LITTLE
MOVING TO AND FROM BED TO CHAIR: 4 - NONE
HELP NEEDED FOR PERSONAL GROOMING: 4 - NONE
DRESSING REGULAR LOWER BODY CLOTHING: 3 - A LITTLE
DRESSING REGULAR UPPER BODY CLOTHING: 3 - A LITTLE
AFFECT: WFL
CLIMB 3 TO 5 STEPS WITH RAILING: 4 - NONE

## 2025-01-16 NOTE — PROGRESS NOTES
Orthopaedic Surgery Progress Note    Interval History  No acute events postoperatively. Pain currently well controlled.     Physical Exam:   General:   No acute distress   Symmetric chest rise bilaterally with normal effort     Musculoskeletal:   Left Upper Extremity  Inspection: Surgical incisions without erythema or drainage.   Motor and sensation limited in setting of preop block. Able to flex and extend all digits, wrist  Vascular: Palpable radial pulse. Brisk capillary refill   Sling in place    Assessment and Plan   Yury Nguyen is a 69 y.o. male who is status post L rTSA    -- POD: 1 Day Post-Op   -- WBS: NWB LUE in sling   -- ROM: 140 FF /40 ER  -- Analgesia   -- DVT PPX   -- PT OT     Madelaine Zhang MD   Orthopaedic Surgery

## 2025-01-16 NOTE — HOSPITAL COURSE
Yury is a 69 y.o. 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 Principal problem is S/P reverse total shoulder arthroplasty, left.    Past Medical History  Yury has a past medical history of A-fib (CMS/HCC), Arthritis, BPH (benign prostatic hyperplasia), CAD (coronary artery disease), Colon polyps, Gout, Hypothyroidism, PONV (postoperative nausea and vomiting), and Thoracic ascending aortic aneurysm (CMS/HCC).    History of Present Illness   Pt with L TSA NWB in sling 11/15 by Dr. Vignesh MOYA PROM 140/40

## 2025-01-16 NOTE — PLAN OF CARE
Plan of Care Review  Plan of Care Reviewed With: patient  Progress: improving  Outcome Evaluation: Patient OOB w/ supervision, remains NWB to GRIFFIN, arm in immobilizer. Pain improving w/ PRN Oxycodone. Cleared for DC home w/ wife.

## 2025-01-16 NOTE — PROGRESS NOTES
Physical Therapy -  Initial Evaluation     Patient: Yury Nguyen  Location: [facility] 5PAV 5438  MRN: [id number]  Today's date: 1/16/2025    HISTORY OF PRESENT ILLNESS     Yury is a 69 y.o. 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 Principal problem is S/P reverse total shoulder arthroplasty, left.    Past Medical History  Yury has a past medical history of A-fib (CMS/[facility]), Arthritis, BPH (benign prostatic hyperplasia), CAD (coronary artery disease), Colon polyps, Gout, Hypothyroidism, PONV (postoperative nausea and vomiting), and Thoracic ascending aortic aneurysm (CMS/[facility]).    History of Present Illness   Pt with L TSA NWB in sling 11/15 by Dr. Vignesh MOYA PROM 140/40  PRIOR LEVEL OF FUNCTION AND LIVING ENVIRONMENT     Prior Level of Function      Flowsheet Row Most Recent Value   Dominant Hand right   Ambulation independent   Transferring independent   Toileting independent   Bathing independent   Dressing independent   Eating independent   IADLs independent   Driving/Transportation friends/family  [cataracts Sx]   Communication understands/communicates without difficulty   Prior Level of Function Comment IND PTA , used axillary crutches for mobility temporarily after big toe Sx. IND IADLs. Pt vs Family drives since Pt's cataracts Sx.   Assistive Device Currently Used at Home none             Prior Living Environment      Flowsheet Row Most Recent Value   People in Home spouse   Current Living Arrangements home   Living Environment Comment Spouse can(A), 2level home, 3 WING with wall to support on R side per Pt, FFSU with main bedroom and bathroom , WIS          VITALS AND PAIN     PT Vitals      Date/Time Pulse SpO2 BP BP Location BP Method Pt Position Who   01/16/25 0915 105 96 % 115/73 Right upper arm Automatic  Sitting GS          PT Pain      Date/Time Pain Type Side/Orientation Location Rating: Rest Rating: Activity Interventions [facility]   01/16/25 0859 Pain Reassessment -- shoulder 3 -- -- EB   01/16/25 0915 Pain Assessment left shoulder 3 4 care clustered GS             Objective   OBJECTIVE     Start time:  0848  End time:  0917  Session Length: 29 min  Mode of Treatment: co-treatment  Reason for co-treatment: POD-1 TSA    General Observations  Patient received reclined, in chair. He was no issues or concerns identified by nurse prior to session, agreeable to therapy. Pt rec'd sitting in recliner chair, agreeable to PT/OT, sling donned upon entry. Pt reports from his L toe Sx, he has been adhering to heel walking PTA    Precautions: weight bearing, range of motion, fall, brace worn at all times (PROM forward elevation 140 and ER 40)  Extremity Weight-Bearing Status: left upper extremity, left lower extremity  Left UE Weight-Bearing Status: non weight-bearing (NWB)  Left LE Weight-Bearing Status: other (see comments) (heel WB via subjective report)           PT Eval and Treat - 01/16/25 0848          Cognition    Orientation Status oriented x 4     Affect/Mental Status WFL     Follows Commands WFL     Cognitive Function WFL     Comment, Cognition pleasant and cooperative in session        Vision Assessment/Intervention    Vision Assessment WFL   s/p corrective cataracts Sx       Hearing Assessment    Hearing Status WFL        Sensory Assessment    Sensory Assessment other (see comments)   Hx of peripheral neuropathies of B/l feet       Edema Symptoms/Interventions    Location upper extremity, left     Description localized     Associated Symptoms pain     Treatment Interventions cryotherapy        Lower Extremity Assessment    LE Assessment ROM and Strength WFL     General Observations 4/5 B /l LE        Bed Mobility    Bed Mobility Activities left;sit to supine     [place]run[place] modified independence     Assistive Device  none     Comment Bed entry from R for sit-supine without (A)        Mobility Belt    Mobility Belt Used During Session yes        Sit/Stand Transfer    Surface edge of bed;chair with arm rests     [place] independent     Assistive Device none     Transfer Comments no (A) for transfers from EOB and recliner, no overt LOB        Gait Training    [place], Gait independent     Assistive Device none     Distance in Feet 150 feet     Pattern step-through     Comment no overt LOB noted, good pacing, Pt adhering to heel WB on LLE throughout        Stairs Training    [place], Stairs modified independence     Safety/Cues verbal cues     Assistive Device railing     Number of Stairs 4   x2    Stair Height 6.5 inches     Ascending Stairs Technique step-to-step     Descending Stairs Technique step-to-step     Comment Pt using step to step method with R SPC in RUE ,then again with leaning on R rail as Pt endorses he has wall for support at home and prefers this over SPC usage for stair navigation. Recommend (S) for home entry as needed        Balance    Static Sitting Balance WFL     Dynamic Sitting Balance WFL     Sit to Stand Dynamic Balance WFL     Static Standing Balance WFL     Dynamic Standing Balance WFL     Comment, Balance WFL, no overt LOB        Impairments/Safety Issues    Impairments Affecting Function pain;range of motion (ROM)                                              Education Documentation  Transfers, taught by Jeana Horton PT at 1/16/2025 11:33 AM.  Learner: Patient  Readiness: Acceptance  Method: Explanation  Response: Verbalizes Understanding  Comment: PT POC, acute PT role, transfer training, gait training, stair training, mobility recommendations to reduce fall risk with proper footwear from Hx of neuropathies    Stairs Negotiation, taught by Jeana Horton PT at 1/16/2025 11:33 AM.  Learner: Patient  Readiness: Acceptance  Method: Explanation  Response: Verbalizes  Understanding  Comment: PT POC, acute PT role, transfer training, gait training, stair training, mobility recommendations to reduce fall risk with proper footwear from Hx of neuropathies    Guarding Techniques and Tips, taught by Jeana Horton PT at 1/16/2025 11:33 AM.  Learner: Patient  Readiness: Acceptance  Method: Explanation  Response: Verbalizes Understanding  Comment: PT POC, acute PT role, transfer training, gait training, stair training, mobility recommendations to reduce fall risk with proper footwear from Hx of neuropathies    Gait Training, taught by Jeana Horton PT at 1/16/2025 11:33 AM.  Learner: Patient  Readiness: Acceptance  Method: Explanation  Response: Verbalizes Understanding  Comment: PT POC, acute PT role, transfer training, gait training, stair training, mobility recommendations to reduce fall risk with proper footwear from Hx of neuropathies    Bed Mobility, taught by Jeana Horton PT at 1/16/2025 11:33 AM.  Learner: Patient  Readiness: Acceptance  Method: Explanation  Response: Verbalizes Understanding  Comment: PT POC, acute PT role, transfer training, gait training, stair training, mobility recommendations to reduce fall risk with proper footwear from Hx of neuropathies        Session Outcome  Patient in chair, upright at end of session, all needs met, call light in reach, chair alarm on, personal items in reach. Nursing notified about change in vital signs, patient's performance, patient's position, and patient's response to therapy/activity.    AM-PAC™ - Mobility (Current Function)     Turning form your back to your side while in flat bed without using bedrails 4 - None   Moving from lying on your back to sitting on the side of a flat bed without using bedrails 4 - None   Moving to and from a bed to a chair 4 - None   Standing up from a chair using your arms 4 - None   To walk in a hospital room 4 - None   Climbing 3-5 steps with a railing 4 - None   AM-PAC™ Mobility Score  24      ASSESSMENT AND PLAN     Progress Summary  Pt seen for PT evaluation, presenting to [facility] for L total shoulder replacement, seen POD-1 with non-weight bearing status to L UE in sling. Upon evaluation, Pt is pleasant and cooperative. Pt was educated on DME needs, recommendations for d/c home, therapeutic exercises, sling donning/doffing/wearing schedule, and weight bearing status. Pt demonstrates impairments in ROM, pain, and strength of L shoulder. Pt demonstrates modified independence for transfers without AD, modified independent for ambulation without AD for 150', modified independent stairs. Recommend Pt d/c home with (A). All PT training complete, PT to sign off.    Patient/Family Therapy Goals Statement: to go home    PT Plan      Flowsheet Row Most Recent Value   Therapy Frequency evaluation only at 01/16/2025 0848            PT Discharge Recommendations      Flowsheet Row Most Recent Value   PT Recommended Discharge Disposition home with assistance at 01/16/2025 0848   Anticipated Equipment Needs if Discharged Home (PT) none at 01/16/2025 0848

## 2025-01-16 NOTE — PROGRESS NOTES
Occupational Therapy -  Initial Evaluation     Patient: Yury Nguyen  Location: [facility] 5PAV 5438  MRN: [id number]  Today's date: 1/16/2025    HISTORY OF PRESENT ILLNESS     Yury is a 69 y.o. 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 Principal problem is S/P reverse total shoulder arthroplasty, left.    Past Medical History  Yury has a past medical history of A-fib (CMS/HCC), Arthritis, BPH (benign prostatic hyperplasia), CAD (coronary artery disease), Colon polyps, Gout, Hypothyroidism, PONV (postoperative nausea and vomiting), and Thoracic ascending aortic aneurysm (CMS/HCC).    History of Present Illness   Pt with L TSA NWB in sling 11/15 by Dr. Vignesh MOYA PROM 140/40  PRIOR LEVEL OF FUNCTION AND LIVING ENVIRONMENT     Prior Level of Function      Flowsheet Row Most Recent Value   Dominant Hand right   Ambulation independent   Transferring independent   Toileting independent   Bathing independent   Dressing independent   Eating independent   IADLs independent   Driving/Transportation friends/family  [cataracts Sx]   Communication understands/communicates without difficulty   Prior Level of Function Comment IND PTA , used axillary crutches for mobility temporarily after big toe Sx. IND IADLs. Pt vs Family drives since Pt's cataracts Sx.   Assistive Device Currently Used at Home none             Prior Living Environment      Flowsheet Row Most Recent Value   People in Home spouse   Current Living Arrangements home   Living Environment Comment Spouse can(A), 2level home, 3 WING with wall to support on R side per Pt, FFSU with main bedroom and bathroom , WIS          Occupational Profile      Flowsheet Row Most Recent Value   Environmental Supports and Barriers supportive spouse          VITALS AND PAIN     OT Vitals       Date/Time Pulse SpO2 BP BP Location BP Method Pt Position [facility]   01/16/25 0915 105 96 % 115/73 Right upper arm Automatic Sitting GS          OT Pain      Date/Time Pain Type Side/Orientation Location Rating: Rest Rating: Activity Interventions [facility]   01/16/25 0859 Pain Reassessment shoulder -- 3 -- -- EB   01/16/25 0915 Pain Assessment shoulder left 3 4 care clustered GS             Objective   OBJECTIVE     Start time:  0849  End time:  0921  Session Length: 32 min  Mode of Treatment: co-treatment  Reason for co-treatment: pod1 R TSA    General Observations  Patient received reclined, in chair. He was agreeable to therapy, no issues or concerns identified by nurse prior to session. RN cleared for therapy.    Precautions: weight bearing, range of motion, fall, brace worn at all times  Extremity Weight-Bearing Status: left upper extremity, left lower extremity  Left UE Weight-Bearing Status: non weight-bearing (NWB) (140 deg FF/40 deg ER (PROM))  Left LE Weight-Bearing Status: other (see comments) (heel WBing per pt)           OT Eval and Treat - 01/16/25 0849          Cognition    Orientation Status oriented x 3     Affect/Mental Status WFL     Follows Commands WFL     Cognitive Function WFL        Vision Assessment/Intervention    Vision Assessment WFL   h/o cararact surgery       Hearing Assessment    Hearing Status WFL        Upper Extremity Range of Motion    Shoulder, Left (ROM) N/A     Elbow, Left (ROM) N/A     Wrist, Left (ROM) 5     Hand, Left (ROM) 5     Shoulder, Right (ROM) 4     Elbow, Right (ROM) 5     Wrist, Right (ROM) 5     Hand, Right (ROM) 5        Upper Extremity Strength    Shoulder, Left (Strength) N/A     Elbow, Left (Strength) N/A     Wrist, Left (Strength) 5     Hand, Left (Strength) 5     Elbow, Right (Strength) 5     Wrist, Right (Strength) 5     Hand, Right (Strength) 5        Bed Mobility    Bed Mobility Activities supine to sit;sit to supine     [place] independent     Assistive  Device none        Mobility Belt    Mobility Belt Used During Session yes        Sit/Stand Transfer    Surface chair with arm rests;edge of bed     [place] independent     Assistive Device none        Toilet Transfer    Transfer Technique sit/stand     [place] independent     Assistive Device none        Shower/Tub Transfer    Transfer Type Shower     Transfer Technique step over, left entry;step over, right entry     [place] independent     Assistive Device none        Functional Mobility    Distance hallway;few steps at bedside     Functional Mobility Comments See PT note for gait/ambulation details        Upper Body Dressing    Comment Per pt, he is familiar with shoulder surgery recovery. Educated on benefits of wearing front opening clothing to assist in dressing operative UE first to mimimize ROM needs at joint, and undressing operative UE last. Pt stated his wife will be able to assist him at home with ADL needs, including dressing.        Lower Body Dressing    Tasks don;pants/bottoms;socks;shoes/slippers     [place] minimum assist (75% or more patient effort)     Position unsupported sitting     Comment Min A required as pt having difficulty crossing his midline to don pants over hips on L (operative) side and manipulate socks/shoes to don. Pt stated his wife will be able to assist him at home with ADL needs, including dressing.        Toileting    Comment Educated on keeping all toileting supplies on the R (non-operative side).        Balance    Static Sitting Balance WFL     Dynamic Sitting Balance WFL     Sit to Stand Dynamic Balance WFL     Static Standing Balance WFL     Dynamic Standing Balance WFL        Upper Extremity (Therapeutic Exercise)    Exercise Position/Type supine;PROM (passive range of motion)     General Exercise left     Range of Motion Exercises shoulder flexion/extension;shoulder external/internal rotation     Weight/Resistance other (see comments)     Reps and  Sets 1 x 10     Comment Therex completed per surgeon's post op protocol and within ROM restrictions (140* FF and 40* ER). Pt tolerated well and educated on hand placement if completing alone and spouse's hand placement if pt is going to have/need assist. Verbalized understanding.                       Upper Extremity Orthosis Management  Type (Upper Extremity Orthosis): shoulder sling without abdominal pillow  Functional Design (Upper Extremity Orthosis): static orthosis  Therapeutic Indications (Upper Extremity Orthosis): post-op positioning/protection, positioning for occupational performance/function, stabilization and support, pain management, rest/inflammation reduction  Wearing Schedule (Upper Extremity Orthosis): other (see comments) (wear as directed per surgeon)  Orthosis Training (Upper Extremity Orthosis): patient, activity limitations/precautions, cleaning/care of orthosis, donning/doffing orthosis, orthosis adjustment, purpose/goals of orthosis, meets goals/outcomes                      Education Documentation  Self-Care, taught by Amanda Medina OT at 1/16/2025 12:02 PM.  Learner: Patient  Readiness: Acceptance  Method: Explanation, Demonstration  Response: Verbalizes Understanding, Demonstrated Understanding  Comment: OT role/POC, safe ADLs/transfers, inc spouse assist        Session Outcome  Patient in chair, reclined at end of session, chair alarm on, all needs met, call light in reach, personal items in reach. Nursing notified about patient's performance and patient's response to therapy/activity.    AM-PAC™ - ADL (Current Function)     Putting on/taking off regular lower body clothing 3 - A Little   Bathing 3 - A Little   Toileting 4 - None   Putting on/taking off regular upper body clothing 3 - A Little   Help for taking care of personal grooming 4 - None   Eating meals 4 - None   AM-PAC™ ADL Score 21      ASSESSMENT AND PLAN     Progress Summary  OT josephine complete. Pt currently pod1 L reverse  TSA, NWB, 140* FF and 40* ER, and is most limited by expected post op deficits including pain, NWB, dec ROM, weakness, and edema. Ind for bed mob/transfers. Min A for LBD. Educated on sling donning/doffing/care/adjustments and performed therex completion per post op protocol as documented. Given current level of function and pt's baseline, rec d/c to home with assist from spouse as needed for ADL completion (especially ones that have a bimanual requirement) once medically cleared and eventual progression to OP PT when medically cleared to do so. No further acute OT needs: D/c OT.    Patient/Family Therapy Goal Statement: to feel better    OT Plan      Flowsheet Row Most Recent Value   Rehab Potential other (see comments) at 01/16/2025 0849   Therapy Frequency evaluation only at 01/16/2025 0849            OT Discharge Recommendations      Flowsheet Row Most Recent Value   OT Recommended Discharge Disposition home with assistance at 01/16/2025 0849   Anticipated Equipment Needs if Discharged Home (OT) none at 01/16/2025 0849

## 2025-01-16 NOTE — PLAN OF CARE
Problem: Adult Inpatient Plan of Care  Goal: Plan of Care Review  Flowsheets (Taken 1/16/2025 1132)  Progress: improving  Outcome Evaluation: PT IE complete. Pt is independent without DME and recommend Pt to d/c home with (A). PT to sign off.  Plan of Care Reviewed With: patient

## 2025-01-16 NOTE — PLAN OF CARE
Care Coordination Discharge Plan Summary    Admission Assessment Summary    General Information  Readmission Within the last 30 days: no previous admission in last 30 days    Living Arrangements  Arrived From: home  Current Living Arrangements: home  People in Home: spouse  Living Arrangement Comments: 2SH    Social Drivers of Health - Screenings  Housing Stability  In the last 12 months, was there a time when you were not able to pay the mortgage or rent on time?: No  In the past 12 months, how many times have you moved where you were living?: 0  At any time in the past 12 months, were you homeless or living in a shelter (including now)?: No  Utility Access  In the past 12 months has the electric, gas, oil, or water company threatened to shut off services in your home?: No  Transportation Needs  In the past 12 months, has lack of transportation kept you from medical appointments or from getting medications?: No  In the past 12 months, has lack of transportation kept you from meetings, work, or from getting things needed for daily living?: No    Functional Status Prior to Admission  Assistive Device/Animal Currently Used at Home: none  Functional Status Comments: independent  IADL Comments: independent    Discharge Needs Assessment    Concerns to be Addressed: no discharge needs identified  Anticipated Changes Related to Illness: none    Discharge Plan Summary    Patient Choice  Patient's Choice of Community Agency(s): n/a       Concerns / Comments: met with pt and wife in room 5438. Independent, however family has been driving him recently. Lives w/ wife in a 2 story [facility] w/ FFSU. No h/o home care. DME: had crutches after foot surgery in Nov. Confirmed pcp and pharmacy. DCP: home w/ wife    Discharge Plan:  Disposition/Destination:   home         Connection to Community  Not applicable      Discharge Transportation:  Is Out of Hospital DNR needed at Discharge: no  Does patient need discharge transport?  NO

## 2025-01-16 NOTE — PLAN OF CARE
Problem: Adult Inpatient Plan of Care  Goal: Plan of Care Review  Outcome: Progressing  Flowsheets (Taken 1/16/2025 1202)  Progress: improving  Outcome Evaluation: OT eval complete  Plan of Care Reviewed With: patient     Problem: Shoulder Arthroplasty (Total, Davion, Reverse)  Goal: Optimal Functional Ability  Outcome: Progressing

## 2025-01-16 NOTE — PROGRESS NOTES
Hospital Medicine     Daily Progress Note       SUBJECTIVE     Patient is doing well today. He reports that his pain is 0/10 at rest, increases to 4-5/10 with movement.   He currently denies HA, dizziness, CP, dyspnea, abdominal pain, N/V.      OBJECTIVE   Vital signs in last 24 hours:  Temp:  [36.1 °C (97 °F)-36.9 °C (98.5 °F)] 36.2 °C (97.1 °F)  Heart Rate:  [] 94  Resp:  [17-19] 18  BP: ()/(52-73) 109/62    Intake/Output Summary (Last 24 hours) at 1/16/2025 1142  Last data filed at 1/16/2025 0215  Gross per 24 hour   Intake 1000 ml   Output 1550 ml   Net -550 ml       Physical Exam  Vitals and nursing note reviewed.   Constitutional:       General: He is not in acute distress.     Appearance: Normal appearance. He is not toxic-appearing.   HENT:      Head: Normocephalic and atraumatic.   Eyes:      Conjunctiva/sclera: Conjunctivae normal.   Cardiovascular:      Rate and Rhythm: Normal rate and regular rhythm.      Heart sounds: Normal heart sounds.   Pulmonary:      Effort: Pulmonary effort is normal. No respiratory distress.      Breath sounds: Normal breath sounds.   Abdominal:      General: Bowel sounds are normal. There is no distension.      Palpations: Abdomen is soft.      Tenderness: There is no abdominal tenderness.   Musculoskeletal:      Cervical back: Neck supple.      Comments: L Shoulder: in sling, dressing C/D/I, neurovascularly intact distally   Skin:     General: Skin is warm and dry.   Neurological:      Mental Status: He is oriented to person, place, and time.   Psychiatric:         Mood and Affect: Mood normal.        LINES, DRAINS, AIRWAYS, WOUNDS   Peripheral IV (Adult) 01/15/25 Posterior;Right Hand (Active)   Number of days: 1       Surgical Incision Shoulder Anterior;Left (Active)   Number of days: 1        LABS, IMAGING, TELEMETRY   Results from last 7 days   Lab Units 01/16/25  0437 01/15/25  1053   SODIUM mEQ/L 135* 133*   POTASSIUM mEQ/L 4.3 4.4   CHLORIDE  mEQ/L 103 101   CO2 mEQ/L 24 24   BUN mg/dL 14 16   CREATININE mg/dL 1.0 1.0   CALCIUM mg/dL 8.5* 9.5   GLUCOSE mg/dL 110* 113*     Results from last 7 days   Lab Units 01/16/25  0437   WBC K/uL 9.17   HEMOGLOBIN g/dL 10.7*   HEMATOCRIT % 32.4*   PLATELETS K/uL 149*     Microbiology Results       ** No results found for the last 720 hours. **          Above labs have been personally reviewed.     Telemetry: SR     ASSESSMENT AND PLAN      # S/P reverse total shoulder arthroplasty, left  s/p L rTSA on 1/15/25  - Management as per Ortho  - Pain control, DVT ppx, WBS, PT/OT as per Ortho  - Encourage IS  - Recommend bowel regimen     # Coronary artery disease involving native coronary artery of native heart without angina pectoris  - continue home: aspirin 81 mg po daily  - continue home: losartan 50 mg po BID  - currently on: metoprolol succinate 75 mg po BID  - Continue statin     # Primary hypertension  - continue home: losartan 50 mg po BID  - currently on: metoprolol succinate 75 mg po BID  - hold HCTZ, can resume on dc     # Other hyperlipidemia  - Continue statin     # BPH (benign prostatic hyperplasia)  - continue home: tamsulosin 0.4 mg po daily  - Increased risk for urinary retention post-op; Monitor voiding post-op  - Recommend aggressive bowel regimen and early ambulation      # Hypothyroidism  - continue home: levothyroxine tablet po daily     # PAF (paroxysmal atrial fibrillation) (CMS/HCC)  Status post cardioversion  Not on Eliquis-last time he took Eliquis was in September 2023  - [id number] score: 2 (1/16/25), moderate-high risk of stroke (2.2% per year)  - continue home: aspirin 81 mg po daily  - currently on: metoprolol succinate 75 mg po BID  - Recommend monitoring on telemetry for 24h post-op     # Aneurysm of ascending aorta without rupture (CMS/HCC)  Under surveillance  - Strict BP control     # Anemia: acute blood loss,  - hemoglobin: 10.7 (1/16/25)  - Noted 3.g drop in hgb; 2/2 acute expected  blood loss from surgery and dilutional from IVF  - No acute intervention warranted; recommend outpatient follow-up to ensure resolution     # Obesity, class I  - BMI: 32.1 (1/15/25)  - Recommend lifestyle modification and weight loss, outpatient PCP follow-up       VTE Prophylaxis:  Current anticoagulants:    None      Code Status: Full Code        Estimated Discharge Date: 1/16/2025     Disposition Planning: as per MYESHA Jones  1/16/2025

## (undated) DEVICE — SLEEVE SCD COMFORT KNEE LENGTH MED

## (undated) DEVICE — DRAPE IOBAN

## (undated) DEVICE — GLOVE SZ 8 LINER PROTEXIS PI BL

## (undated) DEVICE — TIP SUCTION FRAZIER 12FR

## (undated) DEVICE — BLADE SCALPEL #15

## (undated) DEVICE — SUTURE TICRON 5 BLUE 5X30 SCC

## (undated) DEVICE — SUTURE MONOCRYL 3-0  Y497G

## (undated) DEVICE — SPONGE LAP 18X18 SAFE-T RFID ENHANCED XRAY

## (undated) DEVICE — TUBING SMOKE EVAC PENCIL COATED

## (undated) DEVICE — COVER TABLE 44X90 ST 22/CS

## (undated) DEVICE — ULTRA TAPE BLUE

## (undated) DEVICE — TOWEL SURGICAL W17XL27IN BLUE COTTON STANDARD PREWASHED DELI

## (undated) DEVICE — GLOVE SZ 8 PROTEXIS PI

## (undated) DEVICE — NEEDLE HALF CIRCLE TAPER MED GALLES FASCIA

## (undated) DEVICE — APPLICATOR CHLORAPREP 26ML ORANGE TINT

## (undated) DEVICE — BLANKET UPPER BODY BAIR HUGGER

## (undated) DEVICE — TIP SUCTION YANKAUER

## (undated) DEVICE — SUTURETAPE 1.3MM SUTURE W NEEDLE

## (undated) DEVICE — FACEMASK FOR SHOULDER POSITONER

## (undated) DEVICE — DRAPE-U-1015

## (undated) DEVICE — MAT ABSORBANT SUPERMAT 50 X 56

## (undated) DEVICE — PAD GROUND ELECTROSURGICAL W/CORD

## (undated) DEVICE — MANIFOLD FOUR PORT NEPTUNE

## (undated) DEVICE — SHEET DRAPE 3/4 REVERSEFOLD 53X77

## (undated) DEVICE — VEST STERILE

## (undated) DEVICE — SUTURE POLYSORB 1 UNDYED 1X30 GS-12

## (undated) DEVICE — SUTURE POLYSORB 2-0 UNDYED 1X30 P-17

## (undated) DEVICE — SOLN IRRIG STER WATER 1000ML

## (undated) DEVICE — DRAPE 3/4 REINFORCED

## (undated) DEVICE — GOWN SIRUS FABRNF RAGLAN XL ST 28/CS

## (undated) DEVICE — COVER LIGHTHANDLE STERILE BLUE

## (undated) DEVICE — ADHESIVE SKIN DERMABOND ADVANCED 0.7ML

## (undated) DEVICE — PACK MLHS SHOULDER PACK RFID STDZ

## (undated) DEVICE — HOOD T7 PLUS

## (undated) DEVICE — BULB SYRINGE IRRIGATION STERILE

## (undated) DEVICE — SUCTION YANKAUER CLR BULB TIP

## (undated) DEVICE — SET HANDPIECE INTERPULSE

## (undated) DEVICE — BLADE SAGITTAL #376 HEAVY WIDE

## (undated) DEVICE — DRAPE POUCH IRRIGATION

## (undated) DEVICE — GAUZE 8X4 16 PLY RFID DOUBLE XRAY